# Patient Record
Sex: MALE | Race: WHITE | NOT HISPANIC OR LATINO | Employment: OTHER | URBAN - METROPOLITAN AREA
[De-identification: names, ages, dates, MRNs, and addresses within clinical notes are randomized per-mention and may not be internally consistent; named-entity substitution may affect disease eponyms.]

---

## 2017-08-10 ENCOUNTER — ALLSCRIPTS OFFICE VISIT (OUTPATIENT)
Dept: OTHER | Facility: OTHER | Age: 27
End: 2017-08-10

## 2017-08-10 DIAGNOSIS — M54.2 CERVICALGIA: ICD-10-CM

## 2017-08-10 DIAGNOSIS — E78.5 HYPERLIPIDEMIA: ICD-10-CM

## 2017-08-11 ENCOUNTER — GENERIC CONVERSION - ENCOUNTER (OUTPATIENT)
Dept: OTHER | Facility: OTHER | Age: 27
End: 2017-08-11

## 2017-08-11 ENCOUNTER — APPOINTMENT (OUTPATIENT)
Dept: RADIOLOGY | Facility: MEDICAL CENTER | Age: 27
End: 2017-08-11
Payer: MEDICARE

## 2017-08-11 ENCOUNTER — TRANSCRIBE ORDERS (OUTPATIENT)
Dept: ADMINISTRATIVE | Facility: HOSPITAL | Age: 27
End: 2017-08-11

## 2017-08-11 ENCOUNTER — APPOINTMENT (OUTPATIENT)
Dept: LAB | Facility: MEDICAL CENTER | Age: 27
End: 2017-08-11
Payer: MEDICARE

## 2017-08-11 DIAGNOSIS — M54.2 CERVICALGIA: ICD-10-CM

## 2017-08-11 DIAGNOSIS — E78.5 HYPERLIPIDEMIA: ICD-10-CM

## 2017-08-11 LAB
ALBUMIN SERPL BCP-MCNC: 3.6 G/DL (ref 3.5–5)
ALP SERPL-CCNC: 103 U/L (ref 46–116)
ALT SERPL W P-5'-P-CCNC: 22 U/L (ref 12–78)
ANION GAP SERPL CALCULATED.3IONS-SCNC: 7 MMOL/L (ref 4–13)
AST SERPL W P-5'-P-CCNC: 15 U/L (ref 5–45)
BILIRUB SERPL-MCNC: 0.28 MG/DL (ref 0.2–1)
BUN SERPL-MCNC: 14 MG/DL (ref 5–25)
CALCIUM SERPL-MCNC: 9.3 MG/DL (ref 8.3–10.1)
CHLORIDE SERPL-SCNC: 105 MMOL/L (ref 100–108)
CHOLEST SERPL-MCNC: 216 MG/DL (ref 50–200)
CO2 SERPL-SCNC: 28 MMOL/L (ref 21–32)
CREAT SERPL-MCNC: 1.07 MG/DL (ref 0.6–1.3)
ERYTHROCYTE [DISTWIDTH] IN BLOOD BY AUTOMATED COUNT: 14.5 % (ref 11.6–15.1)
GFR SERPL CREATININE-BSD FRML MDRD: 95 ML/MIN/1.73SQ M
GLUCOSE P FAST SERPL-MCNC: 109 MG/DL (ref 65–99)
HCT VFR BLD AUTO: 41.7 % (ref 36.5–49.3)
HDLC SERPL-MCNC: 36 MG/DL (ref 40–60)
HGB BLD-MCNC: 13.7 G/DL (ref 12–17)
LDLC SERPL CALC-MCNC: 133 MG/DL (ref 0–100)
MCH RBC QN AUTO: 29.2 PG (ref 26.8–34.3)
MCHC RBC AUTO-ENTMCNC: 32.9 G/DL (ref 31.4–37.4)
MCV RBC AUTO: 89 FL (ref 82–98)
PLATELET # BLD AUTO: 209 THOUSANDS/UL (ref 149–390)
PMV BLD AUTO: 11.1 FL (ref 8.9–12.7)
POTASSIUM SERPL-SCNC: 3.8 MMOL/L (ref 3.5–5.3)
PROT SERPL-MCNC: 7.5 G/DL (ref 6.4–8.2)
RBC # BLD AUTO: 4.69 MILLION/UL (ref 3.88–5.62)
SODIUM SERPL-SCNC: 140 MMOL/L (ref 136–145)
TRIGL SERPL-MCNC: 236 MG/DL
TSH SERPL DL<=0.05 MIU/L-ACNC: 1.14 UIU/ML (ref 0.36–3.74)
WBC # BLD AUTO: 7.01 THOUSAND/UL (ref 4.31–10.16)

## 2017-08-11 PROCEDURE — 80053 COMPREHEN METABOLIC PANEL: CPT

## 2017-08-11 PROCEDURE — 80061 LIPID PANEL: CPT

## 2017-08-11 PROCEDURE — 85027 COMPLETE CBC AUTOMATED: CPT

## 2017-08-11 PROCEDURE — 36415 COLL VENOUS BLD VENIPUNCTURE: CPT

## 2017-08-11 PROCEDURE — 84443 ASSAY THYROID STIM HORMONE: CPT

## 2017-08-11 PROCEDURE — 72050 X-RAY EXAM NECK SPINE 4/5VWS: CPT

## 2017-10-30 ENCOUNTER — ALLSCRIPTS OFFICE VISIT (OUTPATIENT)
Dept: OTHER | Facility: OTHER | Age: 27
End: 2017-10-30

## 2017-10-31 NOTE — PROGRESS NOTES
Assessment  1  Encounter for preventive health examination (V70 0) (Z00 00)    Discussion/Summary    Physical forms were filled out  Follow-up as needed  Chief Complaint  patient present's today for pre employment paperwork      History of Present Illness  HPI: He presents today for a work physical papers to be filled out  Review of Systems    Constitutional: no fever or chills, feels well, no tiredness, no recent weight loss or weight gain  ENT: no complaints of earache, no loss of hearing, no nosebleeds or nasal discharge, no sore throat or hoarseness  Cardiovascular: no complaints of slow or fast heart rate, no chest pain, no palpitations, no leg claudication or lower extremity edema  Respiratory: no complaints of shortness of breath, no wheezing or cough, no dyspnea on exertion, no orthopnea or PND  Gastrointestinal: no complaints of abdominal pain, no constipation, no nausea or vomiting, no diarrhea or bloody stools  Genitourinary: no complaints of dysuria or incontinence, no hesitancy, no nocturia, no genital lesion, no inadequacy of penile erection  Musculoskeletal: no complaints of arthralgia, no myalgia, no joint swelling or stiffness, no limb pain or swelling  Integumentary: no complaints of skin rash or lesion, no itching or dry skin, no skin wounds  Neurological: no complaints of headache, no confusion, no numbness or tingling, no dizziness or fainting  Active Problems  1  [place] hump (278 1) (E65)   2  YISEL (generalized anxiety disorder) (300 02) (F41 1)   3  Hyperlipidemia (272 4) (E78 5)   4  Neck pain (723 1) (M54 2)   5  Orthostatic hypotension (458 0) (I95 1)   6  Schizophrenia (295 90) (F20 9)    Past Medical History  Active Problems And Past Medical History Reviewed: The active problems and past medical history were reviewed and updated today  Family History  Father    1  Family history of alcohol abuse (V61 41) (Z81 1)   2   Family history of Fibromyalgia  Maternal Grandmother    3  Family history of paranoid schizophrenia (V17 0) (Z81 8)  Paternal Uncle    4  Family history of paranoid schizophrenia (V17 0) (Z81 8)  Family History Reviewed: The family history was reviewed and updated today  Social History   · Education Level: Some college   · Never A Smoker   · Never Drank Alcohol   · Single  The social history was reviewed and updated today  Surgical History  1  History of Oral Surgery Tooth Extraction  Surgical History Reviewed: The surgical history was reviewed and updated today  Current Meds   1  Citalopram Hydrobromide 40 MG Oral Tablet; TAKE 1 TABLET DAILY; Therapy: [id number] to Recorded   2  ClonazePAM 0 5 MG Oral Tablet; TAKE 1 TABLET Twice daily PRN; Therapy: 75QNW7326 to (Evaluate:02Jun2015) Recorded   3  CloZAPine 100 MG Oral Tablet; TAKE 1 1/2  TABLET PO  Bedtime; Therapy: 41ITA1009 to ([place]) Recorded   4  Vraylar 1 5 MG Oral Capsule; Therapy: 85DZO3053 to Recorded    The medication list was reviewed and updated today  Allergies  1  No Known Drug Allergies    Vitals   Recorded: 59EIM2314 12:36PM   Temperature 98 4 F, Tympanic   Systolic 368, LUE, Sitting   Diastolic 72, LUE, Sitting   Height 5 ft 10 08 in   Weight 228 lb 2 oz   BMI Calculated 32 66   BSA Calculated 2 21     Physical Exam    Constitutional   General appearance: No acute distress, well appearing and well nourished  Eyes   Conjunctiva and lids: No swelling, erythema, or discharge  Pupils and irises: Equal, round and reactive to light  Ears, Nose, Mouth, and Throat   External inspection of ears and nose: Normal     Otoscopic examination: Tympanic membrance translucent with normal light reflex  Canals patent without erythema  Nasal mucosa, septum, and turbinates: Normal without edema or erythema  Oropharynx: Normal with no erythema, edema, exudate or lesions      Pulmonary   Respiratory effort: No increased work of breathing or signs of respiratory distress  Auscultation of lungs: Clear to auscultation, equal breath sounds bilaterally, no wheezes, no rales, no rhonci  Cardiovascular   Palpation of heart: Normal PMI, no thrills  Auscultation of heart: Normal rate and rhythm, normal S1 and S2, without murmurs  Examination of extremities for edema and/or varicosities: Normal     Carotid pulses: Normal     Abdomen   Abdomen: Non-tender, no masses  Liver and spleen: No hepatomegaly or splenomegaly  Lymphatic   Palpation of lymph nodes in neck: No lymphadenopathy  Musculoskeletal   Gait and station: Normal     Digits and nails: Normal without clubbing or cyanosis  Inspection/palpation of joints, bones, and muscles: Normal     Skin   Skin and subcutaneous tissue: Normal without rashes or lesions  Neurologic   Cranial nerves: Cranial nerves 2-12 intact  Reflexes: 2+ and symmetric  Sensation: No sensory loss      Psychiatric   Orientation to person, place and time: Normal     Mood and affect: Normal          Future Appointments    Date/Time Provider Specialty Site   02/12/2018 12:30 PM Joseph Carter DO Family Medicine  [facility]     Signatures   Electronically signed by : Ails Cm DO; Oct 30 2017  1:04PM EST                       (Author)

## 2018-01-11 NOTE — PSYCH
History of Present Illness  [facility] Clinical Progress Note [facility]:   Specialized Services Documentation - Therapist must complete separate progress note for each specific clinical activity in which the client participated during the day  (262) Group Psychotherapy: ([id number]) Client attended group focused on self-sabotage and how it can hinder the group from self-care, and group members discussed their common excuses for avoiding activity, while finally reflecting on some self-care methods that would benefit them  Sima Farias shared that he makes excuses to procrastinate on projects  Minimal progress toward goal  Continue to attend group to offer opportunities to relate to peers appropriately  VINNY Nazario    Treatment Plan Problem(s): 1 0      (761) Group Psychotherapy: [id number] Sima Farias participated in wellness group focused on identifying self-esteem and the relationship of self-esteem to mental illness  Pt shared that he can laugh at himself when he makes mistakes and gave the example of tripping  Sima Farias stated he doesn't get angry with himself he laughs  Pt stated that he "only has a problem feeling comfortable meeting new people when they don't talk to me or they don't talk first " Sima Farias did admit that he does not practice social skills starting conversations and expects that others will do so  Pt made minimal progress toward goal  Continue group to educate patient on the impact of low self-esteem on mental health and methods to increase self-esteem for more positive outcomes in recovery  Treatment Plan Problem(s): 1 0  Cynthia Bella RN       (754) Education Therapy Goals set - to call [employer]    Treatment Plan Problem(s): 1 6  Education Therapy Time - 0900 - 0930 Previous goal was not met  Readiness to Learning:  He is receptive to learning  There are  no barriers to learning  Learning Assessment Time - 1330 - 1400   Education completed on  illness, aftercare and wellness tools     The teaching method was  verbal  Shared area of learning: Yes  DELPHINE SpringBC     (283) Allied Therapy [id number] Wilfredoshahla Trotterbaldo actively shared in [facility] group focused on boundary setting  He engaged in improvisation and was attentive during discussion exploring value of and ways to set healthy limits with self and others  Group discussed reality of feeling guilty at times, yet the anxiety and chaos left if one chooses not to set limits and practiced boundaries with given scenarios  He was quiet but pleasant  Inconsistent work toward goal noted  Continue AT to encourage skill development and use  Treatment Plan Problem(s): 1 5  DELPHINE SpringBC       Case Management Note:   [id number] Wilfredo Harrington met with CM to discuss D/C  He understands that tomorrow is D/C date  Relapse Prevention Plan form given to Wilfredo Len to complete and assistance offered, if he feels it is necessary, to complete  VM left for pt's ICM to notifiy of D/C per patient's, and ICM request  CM also left VM message for patient's parents offering family session tomorrow afternoon  Waiting for return call  TREATMENT SESSION NUMBER: 10   Current suicide risk is low  Medications not changed/added/denied  Brandi Sexton, RN      Active Problems    1  YISEL (generalized anxiety disorder) (300 02) (F41 1)   2  Hyperlipidemia (272 4) (E78 5)   3  Schizophrenia (295 90) (F20 9)   4  Strain of left foot, initial encounter (845 10) (I78 246E)    Allergies    1  No Known Drug Allergies    Current Meds   1  Citalopram Hydrobromide 40 MG Oral Tablet; TAKE 1 TABLET DAILY; Therapy: 70PMQ5791 to Recorded   2  ClonazePAM 0 5 MG Oral Tablet; TAKE 1 TABLET Twice daily PRN; Therapy: 21IUR6855 to (Evaluate:02Jun2015) Recorded   3  CloZAPine 100 MG Oral Tablet; TAKE 1 1/2  TABLET PO  Bedtime; Therapy: 26Apr2016 to (Ирина Tovar) Recorded    Family Psych History  Father    1  Family history of alcohol abuse (V61 41) (Z81 1)   2   Family history of Fibromyalgia  Maternal Grandmother    3  Family history of paranoid schizophrenia (V17 0) (Z81 8)  Paternal Uncle    4  Family history of paranoid schizophrenia (V17 0) (Z81 8)    Social History    · Education Level: Some college   · Never A Smoker   · Never Drank Alcohol   · Single    Future Appointments    Date/Time Provider Specialty Site   05/24/2016 10:00 AM VINNY Morgan   [facility] PARTIAL HOSPITALIZATION     Signatures   Electronically signed by : Grisel Abbasi MEd; May 23 2016 10:41AM EST                       (Author)    Electronically signed by : Sarina Bond RN; May 23 2016  2:16PM EST                       (Author)    Electronically signed by : MAURICE Vo; May 23 2016  2:29PM EST                       (Author)    Electronically signed by : Sarina Bond RN; May 23 2016  3:56PM EST                       (Author)
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
## 2018-01-12 NOTE — PSYCH
History of Present Illness  [facility] Clinical Progress Note [facility]:   Specialized Services Documentation - Therapist must complete separate progress note for each specific clinical activity in which the client participated during the day  (212) Group Psychotherapy: ([id number]) Sima Farias sat quietly throughout psychotherapy group focused on relationships and coping  Although he seemed to be actively listening as peers shared, he did not join in any peer discussions  No progress towards goal  Continue group to offer opportunity to relate to peers around similar issues  Treatment Plan Problem(s): 1 1  Dillon Montoya LCSW     (474) Group Psychotherapy: 0130-0733 Sima Farias participated in wellness group focused on assessment of weekly goal work in each area of functioning; including physical, emotional, cognitive, social and spiritual  Pt shared that he has targeted the area of increasing his physical activity to feel better  He stated that he used to "work out 3-5 times weekly in high school but does not exercise on a regular basis now and has gained weight  Pt states he knows to build up gradually to exercising 3-5 times weekly as he is "not in shape " Pt made good beginning progress toward goal  Continue group to educate and encourage patient to identify areas of functioning requiring ongoing attention for healthier functioning  Treatment Plan Problem(s): 1 1  Cynthia Bella RN       (184) Education Therapy Goals set - job search for 1 hour    Treatment Plan Problem(s): 1 1  Education Therapy Time - 0900 - 0930, Time first day   Readiness to Learning:  He is receptive to learning  There are  no barriers to learning  Learning Assessment Time - 1330 - 1400   Education completed on  illness, medication and wellness tools  The teaching method was  verbal  Shared area of learning: Yes  MAURICE Browning     (020) Allied Therapy 5126-0700 Sima Farias was excused from [facility] group due to case management assessment  Kamryn Austin [facility]       Case Management Note:   9421-1895 Lori Davey met with CM to complete Initial Evaluation and sign ROIs  Pt was given contact information for [facility] program and crisis contacts  Pt denies SI and HI and states he has been to [facility] in the past and is here now voluntarily  Pt has requested [facility] van transportation  This CM/RN has discussed with pt necessity of having labs drawn weekly for Clozaril monitoring and Lori Davey states he understands and is willing to comply  He will go with his mother, Oriana Colon (emergency contact) for lab draws  Pt educated about requirements for attendance and other information regarding Program    TREATMENT SESSION NUMBER: 1   Current suicide risk is low  Medications not changed/added/denied  Nellie Webster RN      Active Problems     1  Hyperlipidemia (272 4) (E78 5)   2  Strain of left foot, initial encounter (845 10) (D71 998C)    Schizophrenia (295 90) (F20 9)          Allergies    1  No Known Drug Allergies    Current Meds   1  Citalopram Hydrobromide 20 MG Oral Tablet; Therapy: 81MSY4134 to Recorded   2  ClonazePAM 0 5 MG Oral Tablet; Therapy: 22VZB9323 to Recorded   3  RisperiDONE 1 MG Oral Tablet; TAKE 2 TABLET Bedtime; Therapy: 34RMH6576 to (Evaluate:30Rrn4706)  Requested for: 42Kci8978; Last   Rx:24Exb5655 Ordered   4  RisperiDONE 4 MG Oral Tablet; Therapy: 97HPR0231 to Recorded   5  Zorvolex 35 MG Oral Capsule; TAKE 1 CAPSULE 3 times daily PRN joint pain; Therapy: 72Xot5475 to (Evaluate:78Rhl8399); Last Rx:76Qit7509 Ordered    Family Psych History  Father    1  Family history of Fibromyalgia    Social History    · Never A Smoker   · Never Drank Alcohol    Future Appointments    Date/Time Provider Specialty Site   05/09/2016 12:00 PM Padmini Go M D  Psychiatry [facility] PARTIAL HOSPITALIZATION   05/10/2016 12:00 PM Padmini Go M D   Psychiatry [facility] PARTIAL HOSPITALIZATION   05/11/2016 11:45 AM Padmini Go [facility]NY Edwards   05/12/2016 11:45 AM VINNY Good  Psychiatry [facility] PARTIAL HOSPITALIZATION   05/13/2016 12:15 PM Jennifer Go M D   Psychiatry [facility] PARTIAL HOSPITALIZATION     Signatures   Electronically signed by : Keenan Gant RN; May  6 2016 12:21PM EST                       (Author)    Electronically signed by : Den Esteves LCSW; May  6 2016  2:18PM EST                       (Author)    Electronically signed by : MAURICE Friedman; May  6 2016  2:23PM EST                       (Author)    Electronically signed by : Keenan Gant RN; May  6 2016  4:04PM EST                       (Author)

## 2018-01-13 VITALS
WEIGHT: 228.13 LBS | TEMPERATURE: 98.4 F | SYSTOLIC BLOOD PRESSURE: 120 MMHG | HEIGHT: 70 IN | BODY MASS INDEX: 32.66 KG/M2 | DIASTOLIC BLOOD PRESSURE: 72 MMHG

## 2018-01-13 NOTE — RESULT NOTES
Verified Results  * XR SPINE CERVICAL COMPLETE 4 OR 5 VW NON INJURY 11Aug2017 02:52PM Yumiko Tavera Order Number: [id number]     Test Name Result Flag Reference   XR SPINE CERVICAL COMPLETE 4 OR 5 VW (Report)     CERVICAL SPINE     INDICATION: Neck pain  COMPARISON: None     VIEWS: 5     IMAGES: 5     FINDINGS:     No evidence of fracture or subluxation  The intervertebral disc spaces are preserved  The neural foramina are patent  The prevertebral soft tissues are within normal limits  The lung apices are intact  IMPRESSION:     Unremarkable cervical spine         Workstation performed: [id number]     Signed by:   Elise Mojica MD   8/16/17
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
## 2018-01-13 NOTE — PROGRESS NOTES
Assessment    1  Encounter for preventive health examination (V70 0) (Z00 00)    Plan  Health Maintenance    · (1) COMPREHENSIVE METABOLIC PANEL; Status:Active; Requested for:[id number]; Health Maintenance, YISEL (generalized anxiety disorder), Hyperlipidemia    · (1) TSH; Status:Active; Requested for:[id number]; Health Maintenance, Hyperlipidemia    · (1) LIPID PANEL, FASTING; Status:Active; Requested for:[id number];     I will call with the lab test results  Followup here as needed  He'll continue to see Dr Bindu Burt for his other medication and lab work  Discussion/Summary  Impression: health maintenance visit  Chief Complaint  PT HERE FOR WELLNESS EXAM BEFORE ENTERING ASSISTED LIVING  HE STATES HE DOES NOT HAVE ANY FORMS TO FILL OUT  History of Present Illness  HM, Adult Male: The patient is being seen for a health maintenance evaluation  General Health: The patient's health since the last visit is described as good  Lifestyle:  He consumes a diverse and healthy diet  He does not have any weight concerns  He does not exercise regularly  He does not use tobacco  He denies alcohol use  He denies drug use  Screening:      Review of Systems    Constitutional: No fever or chills, feels well, no tiredness, no recent weight gain or weight loss  Eyes: No complaints of eye pain, no red eyes, no discharge from eyes, no itchy eyes  ENT: no complaints of earache, no hearing loss, no nosebleeds, no nasal discharge, no sore throat, no hoarseness  Cardiovascular: No complaints of slow heart rate, no fast heart rate, no chest pain, no palpitations, no leg claudication, no lower extremity  Respiratory: No complaints of shortness of breath, no wheezing, no cough, no SOB on exertion, no orthopnea or PND  Gastrointestinal: No complaints of abdominal pain, no constipation, no nausea or vomiting, no diarrhea or bloody stools     Genitourinary: No complaints of dysuria, no incontinence, no hesitancy, no nocturia, no genital lesion, no testicular pain  Musculoskeletal: No complaints of arthralgia, no myalgias, no joint swelling or stiffness, no limb pain or swelling  Integumentary: No complaints of skin rash or skin lesions, no itching, no skin wound, no dry skin  Neurological: No compliants of headache, no confusion, no convulsions, no numbness or tingling, no dizziness or fainting, no limb weakness, no difficulty walking  Endocrine: No complaints of proptosis, no hot flashes, no muscle weakness, no erectile dysfunction, no deepening of the voice, no feelings of weakness  Hematologic/Lymphatic: No complaints of swollen glands, no swollen glands in the neck, does not bleed easily, no easy bruising  Active Problems    1  YISEL (generalized anxiety disorder) (300 02) (F41 1)   2  Hyperlipidemia (272 4) (E78 5)   3  Schizophrenia (295 90) (F20 9)   4  Strain of left foot, initial encounter (845 10) (E80 850V)    Surgical History    · History of Oral Surgery Tooth Extraction    Family History  Father    · Family history of alcohol abuse (V61 41) (Z81 1)   · Family history of Fibromyalgia  Maternal Grandmother    · Family history of paranoid schizophrenia (V17 0) (Z81 8)  Paternal Uncle    · Family history of paranoid schizophrenia (V17 0) (Z81 8)    Social History    · Education Level: Some college   · Never A Smoker   · Never Drank Alcohol   · Single    Current Meds   1  Citalopram Hydrobromide 40 MG Oral Tablet; TAKE 1 TABLET DAILY; Therapy: 46PCK5875 to Recorded   2  ClonazePAM 0 5 MG Oral Tablet; TAKE 1 TABLET Twice daily PRN; Therapy: 77HEX4111 to (Evaluate:02Jun2015) Recorded   3  CloZAPine 100 MG Oral Tablet; TAKE 1 1/2  TABLET PO  Bedtime; Therapy: 11KHA6771 to ([facility]) Recorded   4  Loxapine Succinate 10 MG Oral Capsule; Therapy: 56RAT3360 to Recorded    Allergies    1   No Known Drug Allergies    Vitals   Recorded: 20ARZ1021 18:31NG   Systolic 949, LUE, Sitting   Diastolic 80, MINNAE, Sitting   Height 5 ft 11 in   Weight 226 lb    BMI Calculated 31 52   BSA Calculated 2 22     Physical Exam    Constitutional   General appearance: No acute distress, well appearing and well nourished  Eyes   Conjunctiva and lids: No swelling, erythema, or discharge  Pupils and irises: Equal, round and reactive to light  Ears, Nose, Mouth, and Throat   External inspection of ears and nose: Normal     Otoscopic examination: Tympanic membrance translucent with normal light reflex  Canals patent without erythema  Oropharynx: Normal with no erythema, edema, exudate or lesions  Pulmonary   Respiratory effort: No increased work of breathing or signs of respiratory distress  Auscultation of lungs: Clear to auscultation  Cardiovascular   Palpation of heart: Normal PMI, no thrills  Auscultation of heart: Normal rate and rhythm, normal S1 and S2, without murmurs  Examination of extremities for edema and/or varicosities: Normal     Abdomen   Abdomen: Non-tender, no masses  Liver and spleen: No hepatomegaly or splenomegaly  Lymphatic   Palpation of lymph nodes in neck: No lymphadenopathy  Musculoskeletal   Gait and station: Normal     Digits and nails: Normal without clubbing or cyanosis  Inspection/palpation of joints, bones, and muscles: Normal     Skin   Skin and subcutaneous tissue: Normal without rashes or lesions  Neurologic   Cranial nerves: Cranial nerves 2-12 intact  Reflexes: 2+ and symmetric  Sensation: No sensory loss      Psychiatric   Orientation to person, place and time: Normal     Mood and affect: Normal        Signatures   Electronically signed by : Cale Garcia DO; Aug  8 2016 10:45AM EST                       (Author)

## 2018-01-15 NOTE — PSYCH
History of Present Illness  [facility] Clinical Progress Note [facility]:   Specialized Services Documentation - Therapist must complete separate progress note for each specific clinical activity in which the client participated during the day  Case Management Note:   0800 Solo informed the [facility] , when he arrived at pt's home, that he was not going to attend [facility] today  This CM contacted pt's father, Chito Cunningham who stated that Artem Edwards reported "not feeling well today " He stated that Artem Edwards may also be staying home as some car parts he had ordered were being delivered and he stated that he discussed with pt that if he was not feeling well he should not be working on his car  Pt was sleeping father stated but will be in Program tomorrow  Pt's labs were received and reviewed by Dr Eri Trejo  Current suicide risk is low  Medications not changed/added/denied  Draed Brooks RN      Active Problems    1  YISEL (generalized anxiety disorder) (300 02) (F41 1)   2  Hyperlipidemia (272 4) (E78 5)   3  Schizophrenia (295 90) (F20 9)   4  Strain of left foot, initial encounter (845 10) (E91 397L)    Allergies    1  No Known Drug Allergies    Current Meds   1  Citalopram Hydrobromide 40 MG Oral Tablet; TAKE 1 TABLET DAILY; Therapy: 93ZXB4822 to Recorded   2  ClonazePAM 0 5 MG Oral Tablet; TAKE 1 TABLET Twice daily PRN; Therapy: 73QRJ8051 to (Evaluate:02Jun2015) Recorded   3  CloZAPine 100 MG Oral Tablet; TAKE 1 1/2  TABLET PO  Bedtime; Therapy: 26Apr2016 to ([facility]) Recorded    Family Psych History  Father    1  Family history of alcohol abuse (V61 41) (Z81 1)   2  Family history of Fibromyalgia  Maternal Grandmother    3  Family history of paranoid schizophrenia (V17 0) (Z81 8)  Paternal Uncle    4   Family history of paranoid schizophrenia (V17 0) (Z81 8)    Social History    · Education Level: Some college   · Never A Smoker   · Never Drank Alcohol   · Single    Future Appointments    Date/Time Provider Specialty Site   05/19/2016 10:30 AM VINNY Angulo  Psychiatry [facility]   05/20/2016 10:30 AM VINNY Angulo   Psychiatry [facility]     Signatures   Electronically signed by : Abigail Machado RN; May 18 2016  9:24AM EST                       (Author)    Electronically signed by : Abigail Machado RN; May 18 2016  9:26AM EST                       (Author)

## 2018-01-15 NOTE — PROGRESS NOTES
Assessment    1  Encounter for preventive health examination (V70 0) (Z00 00)   2  Hyperlipidemia (272 4) (E78 5)   3  Neck pain (723 1) (M54 2)   4  [place] hump (278 1) (E65)    Plan  Hyperlipidemia    · (1) CBC/ PLT (NO DIFF); Status:Active; Requested for:10Aug2017;    · (1) COMPREHENSIVE METABOLIC PANEL; Status:Active; Requested for:10Aug2017;    · (1) LIPID PANEL, FASTING; Status:Active; Requested for:10Aug2017;    · (1) TSH; Status:Active; Requested for:10Aug2017;    · Begin or continue regular aerobic exercise  Gradually work up to at least 3 sessions of 30  minutes of exercise a week ; Status:Complete;   Done: 15QLQ9634 01:10PM   · Follow-up visit in 4 Months Evaluation and Treatment  Follow-up  Status: Hold For -  Scheduling  Requested for: 10Aug2017  Neck pain    · * XR SPINE CERVICAL COMPLETE 4 OR 5 VW NON INJURY; Status:Active; Requested  for:10Aug2017;     Discussion/Summary  Impression: health maintenance visit  We'll check some lab work and he will call with results  Also check an x-ray of his neck  He does follow-up with his psychiatrist to see if any of this could be side effects from his medication  Follow-up here as needed  Chief Complaint  annual physical   complains of stiffness in neck/back      History of Present Illness  , Adult Male: The patient is being seen for a health maintenance evaluation  General Health: The patient's health since the last visit is described as good  Screening:   HPI: He has been feeling well however he does complain of neck pain and crunching when he moves his head  His has been going on for several years  Review of Systems    Constitutional: No fever or chills, feels well, no tiredness, no recent weight gain or weight loss  Eyes: No complaints of eye pain, no red eyes, no discharge from eyes, no itchy eyes  ENT: no complaints of earache, no hearing loss, no nosebleeds, no nasal discharge, no sore throat, no hoarseness     Cardiovascular: No complaints of slow heart rate, no fast heart rate, no chest pain, no palpitations, no leg claudication, no lower extremity  Respiratory: No complaints of shortness of breath, no wheezing, no cough, no SOB on exertion, no orthopnea or PND  Gastrointestinal: No complaints of abdominal pain, no constipation, no nausea or vomiting, no diarrhea or bloody stools  Musculoskeletal: as noted in HPI  Integumentary: No complaints of skin rash or skin lesions, no itching, no skin wound, no dry skin  Neurological: No compliants of headache, no confusion, no convulsions, no numbness or tingling, no dizziness or fainting, no limb weakness, no difficulty walking  Active Problems    1  YISEL (generalized anxiety disorder) (300 02) (F41 1)   2  Hyperlipidemia (272 4) (E78 5)   3  Orthostatic hypotension (458 0) (I95 1)   4  Schizophrenia (295 90) (F20 9)    Surgical History    · History of Oral Surgery Tooth Extraction    Family History  Father    · Family history of alcohol abuse (V61 41) (Z81 1)   · Family history of Fibromyalgia  Maternal Grandmother    · Family history of paranoid schizophrenia (V17 0) (Z81 8)  Paternal Uncle    · Family history of paranoid schizophrenia (V17 0) (Z81 8)    Social History    · Education Level: Some college   · Never A Smoker   · Never Drank Alcohol   · Single    Current Meds   1  Citalopram Hydrobromide 40 MG Oral Tablet; TAKE 1 TABLET DAILY; Therapy: 96MZR3738 to Recorded   2  ClonazePAM 0 5 MG Oral Tablet; TAKE 1 TABLET Twice daily PRN; Therapy: 93CYW9760 to (Evaluate:02Jun2015) Recorded   3  CloZAPine 100 MG Oral Tablet; TAKE 1 1/2  TABLET PO  Bedtime; Therapy: 46SHP4115 to ([facility]) Recorded   4  Vraylar 1 5 MG Oral Capsule; Therapy: 76KCX9430 to Recorded    Allergies    1   No Known Drug Allergies    Vitals   Recorded: 10Aug2017 12:42PM   Temperature 98 2 F, Tympanic   Heart Rate 124, L Radial   Pulse Quality Normal, L Radial   Systolic 425, LUE, Sitting Diastolic 80, LUE, Sitting   Height 5 ft 10 in   Weight 228 lb 8 oz   BMI Calculated 32 79   BSA Calculated 2 21   O2 Saturation 98     Physical Exam    Constitutional   General appearance: Abnormal   comfortable and obese  Eyes   Conjunctiva and lids: No swelling, erythema, or discharge  Pupils and irises: Equal, round and reactive to light  Ears, Nose, Mouth, and Throat   External inspection of ears and nose: Normal     Otoscopic examination: Tympanic membrance translucent with normal light reflex  Canals patent without erythema  Oropharynx: Normal with no erythema, edema, exudate or lesions  Pulmonary   Respiratory effort: No increased work of breathing or signs of respiratory distress  Auscultation of lungs: Clear to auscultation  Cardiovascular   Palpation of heart: Normal PMI, no thrills  Auscultation of heart: Normal rate and rhythm, normal S1 and S2, without murmurs  Examination of extremities for edema and/or varicosities: Normal     Abdomen   Abdomen: Non-tender, no masses  Liver and spleen: No hepatomegaly or splenomegaly  Lymphatic   Palpation of lymph nodes in neck: No lymphadenopathy  Musculoskeletal   Gait and station: Normal     Digits and nails: Abnormal   neck pain, decrerased rom, [place] hump  Inspection/palpation of joints, bones, and muscles: Normal     Skin   Skin and subcutaneous tissue: Normal without rashes or lesions  Neurologic   Cranial nerves: Cranial nerves 2-12 intact  Reflexes: 2+ and symmetric  Sensation: No sensory loss  Psychiatric   Orientation to person, place and time: Normal     Mood and affect: Normal        Results/Data  PHQ-9 Adult Depression Screening 10Aug2017 12:46PM User Ketsus     Test Name Result Flag Reference   PHQ-9 Adult Depression Score 4     Over the last two weeks, how often have you been bothered by any of the following problems?   Little interest or pleasure in doing things: Nearly every day - 3  Feeling down, depressed, or hopeless: Not at all - 0  Trouble falling or staying asleep, or sleeping too much: Not at all - 0  Feeling tired or having little energy: Several days - 1  Poor appetite or over eating: Not at all - 0  Feeling bad about yourself - or that you are a failure or have let yourself or your family down: Not at all - 0  Trouble concentrating on things, such as reading the newspaper or watching television: Not at all - 0  Moving or speaking so slowly that other people could have noticed   Or the opposite -  being so fidgety or restless that you have been moving around a lot more than usual: Not at all - 0  Thoughts that you would be better off dead, or of hurting yourself in some way: Not at all - 0   PHQ-9 Adult Depression Screening Negative     PHQ-9 Difficulty Level Somewhat difficult     PHQ-9 Severity Minimal Depression         Signatures   Electronically signed by : Ade Joseph DO; Aug 10 2017  1:12PM EST                       (Author)

## 2018-01-15 NOTE — PSYCH
History of Present Illness  [facility] Clinical Progress Note [facility]:   Specialized Services Documentation - Therapist must complete separate progress note for each specific clinical activity in which the client participated during the day  (034) Group Psychotherapy: 12:30-1:30pm   Ct participated in group  The focus of the group was anger management  Reviewed handouts, Anger and the 3 R's, anger triggers and Comparison between healthy and unhealthy anger traits  Each ct also listed two of their anger triggers  Identified his father as a trigger  Spontaneously participated not only when called on to do so  Mild progress on goal  Continue with group  Treatment Plan Problem(s): 1 1  Maddy Arcos, EUGENE       (423) Group Psychotherapy: [id number] Reina Costello participated in wellness group focused on unmet expectations role in anger toward self and others  Pt listened attentively and followed along in handout as discussion proceeded with peers but Reina Costello did not contribute to discussion today except to acknowledge that he does not meet his parents expectations at times and they do not meet his expectations at times  Pt made minimal progress toward goals  Continue goal to educate pt regarding the powerful force expectations have in determining our behaviors and responses including practicing more healthy expectations that will decrease rather than increase anger  Treatment Plan Problem(s): 1 1  Alycia Borrego RN     (169) Education Therapy Goals set - work on car    Treatment Plan Problem(s): 1 1  Education Therapy Time - 0900 - 0930 Previous goal was met  Readiness to Learning:  He is receptive to learning  There are  no barriers to learning  Learning Assessment Time - 1330 - 1400   Education completed on  illness and wellness tools  The teaching method was  verbal  Shared area of learning: Yes   Joseline Carter MT-BC     (508) Allied Therapy [id number] Reina Costello actively shared in relaxation group focused on skill development and the concept of giovanna santos  Engaged in therapist led exercises and reported benefit  During group discussion on giovanna santos, he participated in group reading yet did not add any personal disclosure  Group reinforced importance of consistently caring for one's self and use of strategies explored to refocus to the present  Some effort toward goal noted  Continue AT to increase skill awareness and use of skills consistently  Treatment Plan Problem(s): 1 1, 1 2  Kamryn Austin [place]       Case Management Note:   [id number] Lori Davey met with CM, and also spoke briefly with Dr Tarik Washburn, regarding his Clozaril medication  Pt was advised that Dr Tarik Washburn has sent an order to his [facility] for 150 mg Cozaril and this is the dose that MD wants him to take  Pt stated he understood these directions  This CM contacted pt's mother, Oriana Colon to advise her of same  TREATMENT SESSION NUMBER: 5   Current suicide risk is low  Medications not changed/added/denied  Nellie Later, RN      Active Problems    1  YISEL (generalized anxiety disorder) (300 02) (F41 1)   2  Hyperlipidemia (272 4) (E78 5)   3  Schizophrenia (295 90) (F20 9)   4  Strain of left foot, initial encounter (845 10) (J26 456D)    Allergies    1  No Known Drug Allergies    Current Meds   1  Citalopram Hydrobromide 40 MG Oral Tablet; TAKE 1 TABLET DAILY; Therapy: 63HUE1915 to Recorded   2  ClonazePAM 0 5 MG Oral Tablet; TAKE 1 TABLET Twice daily PRN; Therapy: 67OZH7932 to (Evaluate:02Jun2015) Recorded   3  CloZAPine 100 MG Oral Tablet; TAKE 1 1/2  TABLET PO  Bedtime; Therapy: 97Vpr9181 to ([facility]) Recorded    Family Psych History  Father    1  Family history of alcohol abuse (V61 41) (Z81 1)   2  Family history of Fibromyalgia  Maternal Grandmother    3  Family history of paranoid schizophrenia (V17 0) (Z81 8)  Paternal Uncle    4   Family history of paranoid schizophrenia (V17 0) (Z81 8)    Social History    · Education Level: Some college   · Never A Smoker   · Never Drank Alcohol   · Single    Future Appointments    Date/Time Provider Specialty Site   05/13/2016 12:15 PM Ryan Go M D  Psychiatry [facility]   05/16/2016 10:45 AM VINNY Silver   Psychiatry [facility]     Signatures   Electronically signed by : Katiuska Skaggs LCSW; May 12 2016  2:15PM EST                       (Author)    Electronically signed by : MARUICE Bishop; May 12 2016  2:25PM EST                       (Author)    Electronically signed by : Barney Corley RN; May 12 2016  2:32PM EST                       (Author)    Electronically signed by : Barney Corley RN; May 12 2016  2:39PM EST                       (Author)
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
## 2018-01-16 NOTE — PSYCH
History of Present Illness  [facility] Clinical Progress Note [facility]:   Specialized Services Documentation - Therapist must complete separate progress note for each specific clinical activity in which the client participated during the day  (274) Group Psychotherapy: 6357-1369 Gabino Has participated in wellness group focusing on completing a [address] to record relevant information such as medications, allergies, three activities to divert attention when having negative thoughts, three supportive individuals to contact in a crisis and phone numbers for [facility], MD after hours for [facility], county crisis and/or to present to emergency room  Pt shared that he has several supports in crisis but that they are all family members; his mother, his father and his grandmother  Gabino Has was able to identify, with encouragement, that he also had an ICM who can be a support as well as his OP psychiatrist, Dr Adrian Sutherland  Pt made slow progress toward goal  Continue group to educate patient on functional coping strategies to utilize in a mental health crisis  Treatment Plan Problem(s): 1 4  Nikita NIDHI Brower     (249) Group Psychotherapy: 12:30-1:30pm  Ct participated in group  The focus of the group was on distorted thinking  We reviewed handouts on 13 Styles of Distorted Thinking and 12 Rules to Zo[place] Day  Each group member discussed which of the distortions and rules that they need to work on  One of the distorted thoughts ct needs to work on is "polarized thinking " Slow progress towards goal  Continue with group  Treatment Plan Problem(s): 1 1  Patsy Soler LCSW     (127) Education Therapy Goals set - work on job search    Treatment Plan Problem(s): 1 2  Education Therapy Time - 0900 - 0930 Previous goal was met  Readiness to Learning:  He is receptive to learning  There are  no barriers to learning    Learning Assessment Time - 1330 - 1400   Education completed on  illness and wellness tools    The teaching method was  verbal  Shared area of learning: Yes  MAURICE Lucero     (198) Allied Therapy 8103-7651 [facility] focused on barriers to communication  Engaged in tasks exploring what makes it difficult to share and strategies to improve with supports  He was active in the tasks and appeared attentive in discussion yet limited personal feedback  He did attempt to participate in the purnima discussion however his associations were loose  He identified that he could practice avoiding âbelow the belt tacticsâ when he communicates and work on staying on topic  Group reinforced personal role in sharing needs and he participated in âIâ statements practice  Slow exploration of goal noted  Continue AT to encourage development and use of healthy wellness tools  Treatment Plan Problem(s): 1 1, 1 2, 1 4  MAURICE Lucero       Case Management Note:   12: Reina Costello met with CM to discuss weekend and he stated that things were calm at home and that he had worked on his car with his father  Pt stated that he normally does not socialize with his brother, who is 25years old and prefers to "hang out with his friends" but that they did go to [employer] together this weekend  CM introduced the topic of [address] but pt states he has been there but that "Its not for me" but did not explain why despite encouragement  Pt's goal is to socialize more and make friends outside the home  Pt was reminded of resource Myrtis Shone, who pt's [facility] had communicated to this CM, has helped pt find a job in the past  Information was written down and given to Reina Trang so he can contact Myrtis Shone at [employer] [phone]  Pt was reminded of labs due today for Clozaril and pt stated he had not remembered but his mother may remember  TREATMENT SESSION NUMBER: 7   Current suicide risk is low  Medications not changed/added/denied  Alycia Borrego, RN      Active Problems    1   YISEL (generalized anxiety disorder) (300 02) (F41 1)   2  Hyperlipidemia (272 4) (E78 5)   3  Schizophrenia (295 90) (F20 9)   4  Strain of left foot, initial encounter (845 10) (O83 994A)    Allergies    1  No Known Drug Allergies    Current Meds   1  Citalopram Hydrobromide 40 MG Oral Tablet; TAKE 1 TABLET DAILY; Therapy: [id number] to Recorded   2  ClonazePAM 0 5 MG Oral Tablet; TAKE 1 TABLET Twice daily PRN; Therapy: 31ZBY1953 to (Evaluate:02Jun2015) Recorded   3  CloZAPine 100 MG Oral Tablet; TAKE 1 1/2  TABLET PO  Bedtime; Therapy: 27Tgx0448 to ([facility] Recorded    Family Psych History  Father    1  Family history of alcohol abuse (V61 41) (Z81 1)   2  Family history of Fibromyalgia  Maternal Grandmother    3  Family history of paranoid schizophrenia (V17 0) (Z81 8)  Paternal Uncle    4  Family history of paranoid schizophrenia (V17 0) (Z81 8)    Social History    · Education Level: Some college   · Never A Smoker   · Never Drank Alcohol   · Single    Future Appointments    Date/Time Provider Specialty Site   05/17/2016 10:30 AM VINNY Alejandro  Psychiatry [facility]   05/18/2016 10:30 AM VINNY Alejandro  Psychiatry [facility]   05/19/2016 10:30 AM VINNY Alejandro  Psychiatry [facility]   05/20/2016 10:30 AM VINNY Alejandro   Psychiatry [facility]     Signatures   Electronically signed by : Delicia Downing RN; May 16 2016 12:20PM EST                       (Author)    Electronically signed by : Delicia Downing RN; May 16 2016 12:59PM EST                       (Author)    Electronically signed by : Delicia Downing RN; May 16 2016  1:15PM EST                       (Author)    Electronically signed by : Duarte Cohen LCSW; May 16 2016  1:52PM EST                       (Author)    Electronically signed by : MAURICE Patricia; May 16 2016  2:06PM EST                       (Author) Electronically signed by : Alycia Borrego RN; May 16 2016  2:07PM EST                       (Author)

## 2018-01-16 NOTE — PSYCH
History of Present Illness  [facility] Clinical Progress Note [facility]:   Specialized Services Documentation - Therapist must complete separate progress note for each specific clinical activity in which the client participated during the day  ([phone]) Group Psychotherapy: (9:30-10:30) Client attended group in which discussion revolved around life stressors and self-care  Heather Sexton participated in the introductions, and appeared attentive throughout the remainder of group  Minimal progress toward goal  Discharge from program at the end of treatment day today  SULEIMAN Mejia  Treatment Plan Problem(s): 1 0, 1 2      (294) Group Psychotherapy: 8443-2861 Heather Sexton participated in wellness group focusing on improving self-esteem and changing âHow You Feel About Yourself  â Pt shared that he has a negative self-view about socializing with others because he lacks social skills with new people he meets  Peers offered support for practicing social skills as a way to increase self-esteem and enlarge group of friends  Pt stated that there are things he does enjoy and that he feels good about such as discussing cars and he feels very confident when he has discussions with others on this topic  Pt made moderate progress toward goal  D/C today  Treatment Plan Problem(s): 1 0, 1 2  Palak Laguna RN       (050) Education Therapy Goals set - Patient shared encouragement with peers to "be open"    Treatment Plan Problem(s): 1 0    Education Therapy Time - 0900 - 0930 Previous goal was not met  Readiness to Learning:  He is receptive to learning  There are  no barriers to learning  Learning Assessment Time - 1330 - 1400   Education completed on  illness, medication, aftercare and wellness tools  The teaching method was  verbal and written  Shared area of learning: Yes  MAURICE Quiroz     (426) Allied Therapy 8267-2979 Heather Sexton actively shared in [facility] group focused on perception   Group explored development of thoughts based on perception and ways to make choices related to view we take of situations and self  Patient identified influences to his perception and was able to summarize learning from group ânew ways to look at [address] [place] [address]  He was open and appeared able to apply concepts to himself  Group discussed strategies to explore ways to reframe perception to helpful versus hurtful  Some work toward goal noted  Discharge at the end of treatment day  Treatment Plan Problem(s): 1 6, 1 5  Tonya Coreas, [facility]       Case Management Note:   [id number] Ashely Guillen met with this CM to complete Discharge Information, Medication List and Relapse Prevention Plan and received copies of same  Treatment Plan and WRAP were reviewed  Ashely Guillen will meet with this CM and his parents for Family Meeting at [facility]    [id number] CM met with patient, and his family, to review his progress in [address] and his parents agreed that he did gain insights and profited from [employer]  Pt stated that his goal going forward is to continue to "rehearse" being more independent and completing ADLs on a set routine so that he can function, at [facility], needing less supervision and more independently  Pt also discussed healthy strategies for handling anger that do not lead to aggression with his parents  Both of Solo's parents agreed that the best strategy for anger management is for Ashely Guillen to "walk away and walk it off" which is what patient feels most safe and comfortable with  Patient is D/C'd today and [facility] has been notified  TREATMENT SESSION NUMBER: 11   Current suicide risk is low  Medications not changed/added/denied  Domenic Dickinson RN       [facility] Follow-up Physician's Orders:   5/24/2015  8:39 AM Discharge Today   Dr Cale Henry- Michael Grubbs, NIDHI      Active Problems    1  YISEL (generalized anxiety disorder) (300 02) (F41 1)   2  Hyperlipidemia (272 4) (E78 5)   3  Schizophrenia (295 90) (F20 9)   4   Strain of left foot, initial encounter (793 10) (N81 972H)    Allergies    1  No Known Drug Allergies    Current Meds   1  Citalopram Hydrobromide 40 MG Oral Tablet; TAKE 1 TABLET DAILY; Therapy: 43EVQ4883 to Recorded   2  ClonazePAM 0 5 MG Oral Tablet; TAKE 1 TABLET Twice daily PRN; Therapy: 91LSK5962 to (Evaluate:02Jun2015) Recorded   3  CloZAPine 100 MG Oral Tablet; TAKE 1 1/2  TABLET PO  Bedtime; Therapy: 17Rqe4732 to (Seamus Keep) Recorded    Family Psych History  Father    1  Family history of alcohol abuse (V61 41) (Z81 1)   2  Family history of Fibromyalgia  Maternal Grandmother    3  Family history of paranoid schizophrenia (V17 0) (Z81 8)  Paternal Uncle    4   Family history of paranoid schizophrenia (V17 0) (Z81 8)    Social History    · Education Level: Some college   · Never A Smoker   · Never Drank Alcohol   · Single    Signatures   Electronically signed by : VINNY Hendricks ; May 24 2016  8:40AM EST                       (Author)    Electronically signed by : GERMÁN Izaguirre; May 24 2016 10:59AM EST                       (Author)    Electronically signed by : MAURICE Carter; May 24 2016  2:56PM EST                       (Author)    Electronically signed by : Lorraine Boateng RN; May 24 2016  4:26PM EST                       (Author)    Electronically signed by : Lorraine Boateng RN; Jun 21 2016  5:08PM EST                       (Author)
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
## 2018-01-16 NOTE — PSYCH
History of Present Illness  [facility] Clinical Progress Note [facility]:   Specialized Services Documentation - Therapist must complete separate progress note for each specific clinical activity in which the client participated during the day  ([phone]) Group Psychotherapy: [phone] Fermin Murphy participated only during the introductory phase of psychotherapy group focused on change  Although he seemed to be listening he did not join in peer discussions  When prompted, he shared hearing the need to have patience  Minimal progress towards goal noted  Continue group to offer opportunity to relate to peers around similar issues  Treatment Plan Problem(s): 1 1  MAURICE Bishop     (403) Group Psychotherapy: 2198-7736 Fermin Murphy participated in wellness group focused on âRecovery from [facility] and included a handout and review of key concepts in recovery  Fermin Murphy shared that he is here at [facility] to work on his problem with "anger" and to learn better coping strategies when he is angry (than being aggressive physically ) Pt was attentive but shared very limited of his personal experience in the Recovery Process  Fermin Murphy made minimal progress  Continue group to educate patient on what key concepts are in recovery from mental illness and choosing individual recovery goals with the aid of these reilly concepts  Treatment Plan Problem(s): 1 1  Unknown NIDHI Corley       (821) Education Therapy Goals set - continue to research job options    Treatment Plan Problem(s): 1 2  Education Therapy Time - 0900 - 0930 Previous goal was met  Readiness to Learning:  He is receptive to learning  There are  no barriers to learning  Learning Assessment Time - 1330 - 1400   Education completed on  illness and wellness tools  The teaching method was  verbal  Shared area of learning: Yes  MAURICE Bishop     (264) Allied Therapy 2928-4472 Fermin Murphy actively shared in [facility] group exploring emotions   Engaged in task sharing triggers and responses to given emotions  His responses were focused on working on his car  Group reinforced the value in recognizing that AAIFLXQ emotionsâ is not controllable, but learning productive responses is achievable  Some progress noted toward goal  Continue AT to explore healthy emotional regulation and role in practicing wellness tools  Treatment Plan Problem(s): 1 1  Author DELPHINE CavanaughBC       Case Management Note:   [address] met with CM to discuss progress in [facility] program  Pt states that he is learning to handle his anger "better" by walking away rather than remaining in a situation where his anger is escalating  Discussed identifying when he is becoming angry and using coping skills before his anger escalates  Torres Childs states that he is taking his medication as ordered and denies any SE  He stated that he did have labs drawn, after Program yesterday for Clozaril  This CM/RN will contact lab for results as they are not automatically forwarded here  TREATMENT SESSION NUMBER: 7   Current suicide risk is low  Medications not changed/added/denied  Tricia Larios RN      Active Problems    1  YISEL (generalized anxiety disorder) (300 02) (F41 1)   2  Hyperlipidemia (272 4) (E78 5)   3  Schizophrenia (295 90) (F20 9)   4  Strain of left foot, initial encounter (845 10) (Q97 661A)    Allergies    1  No Known Drug Allergies    Current Meds   1  Citalopram Hydrobromide 40 MG Oral Tablet; TAKE 1 TABLET DAILY; Therapy: [id number] to Recorded   2  ClonazePAM 0 5 MG Oral Tablet; TAKE 1 TABLET Twice daily PRN; Therapy: 21ZQY4001 to (Evaluate:02Jun2015) Recorded   3  CloZAPine 100 MG Oral Tablet; TAKE 1 1/2  TABLET PO  Bedtime; Therapy: 91Nxt3076 to (Angi Choudhary) Recorded    Family Psych History  Father    1  Family history of alcohol abuse (V61 41) (Z81 1)   2  Family history of Fibromyalgia  Maternal Grandmother    3   Family history of paranoid schizophrenia (V17 0) (Z81 8)  Paternal

## 2018-01-16 NOTE — PSYCH
History of Present Illness  [facility] Clinical Progress Note [facility]:   Specialized Services Documentation - Therapist must complete separate progress note for each specific clinical activity in which the client participated during the day  ([phone]) Group Psychotherapy: ([phone]) Client attended group focusing on health relationships and communication  Juventino Eddy stated that he has a close, personal relationship with his father who is his main support  Juventino Eddy spoke during group but at times his responses were disorganized or did not make sense  Minimal progress toward goal  Continue to attend group to offer opportunities to relate to peers appropriately  VINNY Moya Ed  Treatment Plan Problem(s): 1 4      (127) Group Psychotherapy: 7291-7180 Juventino Eddy participated in wellness group focused on assessment of weekly goal work in each area of functioning; including physical, emotional, cognitive, social and spiritual  Pt shared that he was going to discuss joining a gym with his parents this weekend saying that he felt he should be more physically active as he was in the past  Peers offered encouragement for pt's goal and he additionally shared that he used to do [employer] and "[employer]," with his father but that they no longer are involved in any physical activity and he misses this  Peers offered other suggestions, aside from gym membership for physical workouts and activity and pt accepted this information well  Juventino Eddy made good progress toward goal  Continue group to educate and encourage patient to identify areas of functioning requiring ongoing attention for healthier functioning  Treatment Plan Problem(s): 1 1,1 2  Shana Turcios RN       (969) Education Therapy Goals set - to call "[employer]"    Treatment Plan Problem(s): 1 6  Education Therapy Time - 0900 - 0930 Previous goal was not met  Readiness to Learning:  He is receptive to learning  There are  no barriers to learning    Learning Assessment Time - 7558 - 1400   Education completed on  illness, aftercare and wellness tools  The teaching method was  verbal  Shared area of learning: Yes  MAURICE Simmons     (217) Allied Therapy 1834-6229 Artem Edwards actively shared in [facility] group focused on support  He engaged in hand chime task exploring concepts of the recovery model and interaction between responsibility, advocacy, and support  He was focused during music activity although his verbalizations remain loose in association  He identified resources for support and ways he could support himself this weekend including reviewing his coping skills  Group explored ways to ask for support  Weekend support resources reviewed  Some effort toward goal noted  Continue AT to reinforce personal role in supporting self and obtaining positive support from others  Treatment Plan Problem(s): 1 6  MAURICE Simmons       Case Management Note:   5032-4045 Artem Edwards met with this CM to discuss his goal last evening of contacting individual who has helped him in the past with job placement per pt's and ICM report  Artem Edwards stated he did not call but could not offer any explanation why he did not  He stated that he will call and leave a voice mail this afternoon, after Program  Discussed D/C for Tues 5/24/16 and Artem Edwards states he feels ready to be D/C'd  This CM encouraged Artem Edwards to plan a daily routine, with assist from his parents, to provide healthy structure to his day and opportunity to socialize outside the home with individuals his own age in addition to working PT eventually or TLC placement that may take time to happen  Pt was in agreement with this plan  TREATMENT SESSION NUMBER: 9   Current suicide risk is low  Medications not changed/added/denied  Dread Brooks RN      Active Problems    1  YISEL (generalized anxiety disorder) (300 02) (F41 1)   2  Hyperlipidemia (272 4) (E78 5)   3  Schizophrenia (295 90) (F20 9)   4   Strain of left foot, initial encounter (665 10) (V84 741Z)    Allergies    1  No Known Drug Allergies    Current Meds   1  Citalopram Hydrobromide 40 MG Oral Tablet; TAKE 1 TABLET DAILY; Therapy: 27GPV1788 to Recorded   2  ClonazePAM 0 5 MG Oral Tablet; TAKE 1 TABLET Twice daily PRN; Therapy: 16TGB6995 to (Evaluate:02Jun2015) Recorded   3  CloZAPine 100 MG Oral Tablet; TAKE 1 1/2  TABLET PO  Bedtime; Therapy: 26Apr2016 to (Wendy Burgos) Recorded    Family Psych History  Father    1  Family history of alcohol abuse (V61 41) (Z81 1)   2  Family history of Fibromyalgia  Maternal Grandmother    3  Family history of paranoid schizophrenia (V17 0) (Z81 8)  Paternal Uncle    4   Family history of paranoid schizophrenia (V17 0) (Z81 8)    Social History    · Education Level: Some college   · Never A Smoker   · Never Drank Alcohol   · Single    Signatures   Electronically signed by : Pau Simmons; May 20 2016 10:46AM EST                       (Author)    Electronically signed by : MAURICE Foreman; May 20 2016  2:34PM EST                       (Author)    Electronically signed by : Maribel Gonzalez RN; May 20 2016  4:29PM EST                       (Author)    Electronically signed by : Maribel Gonzalez RN; May 23 2016  9:58AM EST                       (Author)
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
## 2018-01-18 NOTE — RESULT NOTES
Verified Results  (1) COMPREHENSIVE METABOLIC PANEL [id number] 55:28ZU Silvia [facility] Order Number: [id number]     Test Name Result Flag Reference   SODIUM 140 mmol/L  136-145   POTASSIUM 3 8 mmol/L  3 5-5 3   CHLORIDE 105 mmol/L  100-108   CARBON DIOXIDE 28 mmol/L  21-32   ANION GAP (CALC) 7 mmol/L  4-13   BLOOD UREA NITROGEN 14 mg/dL  5-25   CREATININE 1 07 mg/dL  0 60-1 30   Standardized to IDMS reference method   CALCIUM 9 3 mg/dL  8 3-10 1   BILI, TOTAL 0 28 mg/dL  0 20-1 00   ALK PHOSPHATAS 103 U/L     ALT (SGPT) 22 U/L  12-78   Specimen collection should occur prior to Sulfasalazine and/or Sulfapyridine administration due to the potential for falsely depressed results  AST(SGOT) 15 U/L  5-45   Specimen collection should occur prior to Sulfasalazine administration due to the potential for falsely depressed results  ALBUMIN 3 6 g/dL  3 5-5 0   TOTAL PROTEIN 7 5 g/dL  6 4-8 2   eGFR 95 ml/min/1 73sq m     National Kidney Disease Education Program recommendations are as follows:  GFR calculation is accurate only with a steady state creatinine  Chronic Kidney disease less than 60 ml/min/1 73 sq  meters  Kidney failure less than 15 ml/min/1 73 sq  meters  GLUCOSE FASTING 109 mg/dL H 65-99   Specimen collection should occur prior to Sulfasalazine administration due to the potential for falsely depressed results  Specimen collection should occur prior to Sulfapyridine administration due to the potential for falsely elevated results  (1) TSH 11Aug2017 02:50PM June [facility] Order Number: [id number]     Test Name Result Flag Reference   TSH 1 140 uIU/mL  0 358-3 740   Patients undergoing fluorescein dye angiography may retain small amounts of fluorescein in the body for 48-72 hours post procedure  Samples containing fluorescein can produce falsely depressed TSH values  If the patient had this procedure,a specimen should be resubmitted post fluorescein clearance       (1) CBC/ PLT (NO DIFF) [id number] 02:50PM [facility] Order Number: [id number]     Test Name Result Flag Reference   HEMATOCRIT 41 7 %  36 5-49 3   HEMOGLOBIN 13 7 g/dL  12 0-17 0   MCHC 32 9 g/dL  31 4-37 4   MCH 29 2 pg  26 8-34 3   MCV 89 fL  82-98   PLATELET COUNT 457 Thousands/uL  149-390   RBC COUNT 4 69 Million/uL  3 88-5 62   RDW 14 5 %  11 6-15 1   WBC COUNT 7 01 Thousand/uL  4 31-10 16   MPV 11 1 fL  8 9-12 7     (1) LIPID PANEL, FASTING 11Aug2017 02:50PM Ronit Rommel   TW Order Number: [id number]     Test Name Result Flag Reference   CHOLESTEROL 216 mg/dL H    HDL,DIRECT 36 mg/dL L 40-60   Specimen collection should occur prior to Metamizole administration due to the potential for falsley depressed results  LDL CHOLESTEROL CALCULATED 133 mg/dL H 0-100   Triglyceride:        Normal ??? ??? ??? ??? ??? ??? ??? <150 mg/dl   ??? ??? ???Borderline High ??? ??? 150-199 mg/dl   ??? ??? ? ?? High ??? ??? ??? ??? ??? ??? ??? 200-499 mg/dl   ??? ??? ? ??Very High ??? ??? ??? ??? ??? >499 mg/dl      Cholesterol:       Desirable ??? ??? ??? ??? <200 mg/dl   ??? ??? Borderline High ??? 200-239 mg/dl   ??? ??? High ??? ??? ??? ??? ??? ??? >239 mg/dl      HDL Cholesterol:       High ??? ???>59 mg/dL   ??? ??? Low ??? ??? <41 mg/dL      This screening LDL is a calculated result  It does not have the accuracy of the Direct Measured LDL in the monitoring of patients with hyperlipidemia and/or statin therapy  Direct Measure LDL (RUA613) must be ordered separately in these patients  TRIGLYCERIDES 236 mg/dL H <=150   Specimen collection should occur prior to N-Acetylcysteine or Metamizole administration due to the potential for falsely depressed results

## 2018-01-18 NOTE — PSYCH
History of Present Illness  [facility] Clinical Progress Note [facility]:   Specialized Services Documentation - Therapist must complete separate progress note for each specific clinical activity in which the client participated during the day  (032) Group Psychotherapy: 6486-0301 Yasmani Rivers participated only during the introductory phase of psychotherapy group focused on determination  Although he seemed to be listening, he did not join in peer discussions  No progress towards goal  Continue group to offer opportunity to relate to peers around similar issues  Treatment Plan Problem(s): 1 1  Jil Potter [facility]     (093) Group Psychotherapy: 9767-8503 Yasmani Rivers participated in wellness group focused on assessment of weekly goal work in each area of functioning; including physical, emotional, cognitive, social and spiritual  Yasmani Rivers shared that his goal for this weekend and for the week ahead is to practice social skills as he feels he has very little socialization outside his family  Pt stated that he has difficulty opening up a conversation with people and "making friends " Several peers offered input on strategies to build social skills and empathized with Solo's struggle offering encouragement for building these skills throughout [facility] Program stay  Pt made good beginning progress was made toward goal  Continue group to educate and encourage patient to identify areas of functioning requiring ongoing attention for healthier functioning  Treatment Plan Problem(s): 1 1  Dalila Powers RN       (087) Education Therapy Goals set - work on his car    Treatment Plan Problem(s): 1 1  Education Therapy Time - 0900 - 0930 Previous goal was met  Readiness to Learning:  He is receptive to learning  There are  no barriers to learning  Learning Assessment Time - 1330 - 1400   Education completed on  illness, medication and wellness tools  The teaching method was  verbal  Shared area of learning: Yes   Elle Pearson Arta Hatchet, [facility]     (012) Allied Therapy 5465-4422 César Chang actively shared in [facility] group focused on WRAP development and use  He SPONTANEOUSLY engaged throughout group exploring key facets of recovery through purnima analysis and sections of the WRAP during group task  He shared examples of categories and took notes for his own WRAP  He was much more engaged, alert and on task than any prior group with this writer  Group explored the benefits of WRAP development and strategies to using this tool to support ongoing recovery  Some progress noted toward goal  Continue AT to explore recovery strategies and use  Treatment Plan Problem(s): 1 2  Latrice Tanmay, [facility]     ( ) Other 1400 CM spoke with pt's ICM, Mr Zapata Clink [phone] per pt's request (JESSE signed)  Mr Sae Ledezma stated that he has worked with César Chang, and his family, for some time now and confirmed that an application has been made for [facility] per pt's, and family's request and César Chang is now on W/L  Mr Sae Ledezma also mentioned that César Chang is forgetful at times and that this CM can remind him to contact [employer] Jacqueline at [email or web address] [phone] to discuss a PT job that César Chang has expressed interest in obtaining  Mr Sae Ledezma stated that César Chang has [employer]'s phone number but may have forgotten to contact him and he has been instrumental to César Chang in the past with job placement  Mr Sae Ledezma will contact CM again to follow Solo's progress in Program  Deric Han, RN     Case Management Note:   Tiffanie Lo met with CM to discuss progress in [facility] and WRAP completion  Pt will work on [employer] this weekend and understands how to complete  Pt discussed that he would like to exercise social skills more and make friends as he discussed in [facility] reviewed using "open questions" especially at car shows that he enjoys attending as well as taking the additional step in inviting someone to attend a car show or have something to eat after a show to build friendships   César Chang denies any AH, VH and states that things are going well, with no angry outbursts or aggression at home between himself and his father or mother  Pt denies any SI and HI ideas, plan or intent  Neha Gil started to take the increased dose of 150 mg Clozaril last evening and reports no SE  This CM contacted [address], at request of Dr Marina Cotton (JESSE signed) and they confirmed that pt has a prescription on file for blood lab draws for Clozaril monitoring, written by Dr Brenda Garcia, for every week so no additional RX's needed for labs at this time  TREATMENT SESSION NUMBER: 6   Current suicide risk is low  Medications changed/added/denied:  Tuyet Cornell RN      Active Problems    1  YISEL (generalized anxiety disorder) (300 02) (F41 1)   2  Hyperlipidemia (272 4) (E78 5)   3  Schizophrenia (295 90) (F20 9)   4  Strain of left foot, initial encounter (845 10) (J99 002R)    Allergies    1  No Known Drug Allergies    Current Meds   1  Citalopram Hydrobromide 40 MG Oral Tablet; TAKE 1 TABLET DAILY; Therapy: 84PWV9902 to Recorded   2  ClonazePAM 0 5 MG Oral Tablet; TAKE 1 TABLET Twice daily PRN; Therapy: 05VRA4305 to (Evaluate:02Jun2015) Recorded   3  CloZAPine 100 MG Oral Tablet; TAKE 1 1/2  TABLET PO  Bedtime; Therapy: 05Ocr1790 to (Vera Jha) Recorded    Family Psych History  Father    1  Family history of alcohol abuse (V61 41) (Z81 1)   2  Family history of Fibromyalgia  Maternal Grandmother    3  Family history of paranoid schizophrenia (V17 0) (Z81 8)  Paternal Uncle    4  Family history of paranoid schizophrenia (V17 0) (Z81 8)    Social History    · Education Level: Some college   · Never A Smoker   · Never Drank Alcohol   · Single    Future Appointments    Date/Time Provider Specialty Site   05/17/2016 10:30 AM VINNY Weiner  Psychiatry [facility] PARTIAL HOSPITALIZATION   05/18/2016 10:30 AM VINNY Weiner   Psychiatry [facility] PARTIAL HOSPITALIZATION   05/19/2016 10:30 AM Donavan VINNY Rangel  Psychiatry [facility] PARTIAL HOSPITALIZATION   05/16/2016 10:45 AM VINNY Starks   Psychiatry [facility] PARTIAL HOSPITALIZATION     Signatures   Electronically signed by : Fariha Alcantara RN; May 13 2016 12:11PM EST                       (Author)    Electronically signed by : Fariha Alcantara RN; May 13 2016 12:26PM EST                       (Author)    Electronically signed by : MAURICE Vitale; May 13 2016  2:59PM EST                       (Author)    Electronically signed by : Fariha Alcantara RN; May 13 2016  5:03PM EST                       (Author)

## 2018-01-22 VITALS
DIASTOLIC BLOOD PRESSURE: 80 MMHG | TEMPERATURE: 98.2 F | HEART RATE: 124 BPM | BODY MASS INDEX: 32.71 KG/M2 | OXYGEN SATURATION: 98 % | HEIGHT: 70 IN | WEIGHT: 228.5 LBS | SYSTOLIC BLOOD PRESSURE: 124 MMHG

## 2018-02-07 RX ORDER — CLONAZEPAM 0.5 MG/1
0.5 TABLET ORAL 2 TIMES DAILY PRN
COMMUNITY
Start: 2015-03-04

## 2018-02-07 RX ORDER — CITALOPRAM 40 MG/1
1 TABLET ORAL DAILY
COMMUNITY
Start: 2016-03-18 | End: 2018-05-10

## 2018-02-07 RX ORDER — CLOZAPINE 100 MG/1
TABLET ORAL
COMMUNITY
Start: 2016-04-26 | End: 2018-05-10

## 2018-02-12 ENCOUNTER — OFFICE VISIT (OUTPATIENT)
Dept: FAMILY MEDICINE CLINIC | Facility: CLINIC | Age: 28
End: 2018-02-12
Payer: MEDICARE

## 2018-02-12 VITALS
BODY MASS INDEX: 30.48 KG/M2 | TEMPERATURE: 96.8 F | HEIGHT: 72 IN | DIASTOLIC BLOOD PRESSURE: 84 MMHG | SYSTOLIC BLOOD PRESSURE: 124 MMHG | WEIGHT: 225 LBS

## 2018-02-12 DIAGNOSIS — E78.2 MIXED HYPERLIPIDEMIA: Primary | ICD-10-CM

## 2018-02-12 PROCEDURE — 99214 OFFICE O/P EST MOD 30 MIN: CPT | Performed by: FAMILY MEDICINE

## 2018-02-12 RX ORDER — CLONAZEPAM 0.5 MG/1
TABLET, ORALLY DISINTEGRATING ORAL
COMMUNITY
Start: 2016-09-09 | End: 2018-05-10

## 2018-02-12 RX ORDER — CITALOPRAM 40 MG/1
40 TABLET ORAL
COMMUNITY
Start: 2016-09-09 | End: 2018-05-10

## 2018-02-12 RX ORDER — CLOZAPINE 100 MG/1
100 TABLET ORAL
COMMUNITY
End: 2020-09-02

## 2018-02-12 NOTE — PATIENT INSTRUCTIONS
We discussed lab work results  I recommend diet exercise to try to control the cholesterol  We should recheck labs in about 3 months follow-up at that time  If necessary we can start medication

## 2018-02-12 NOTE — PROGRESS NOTES
Assessment/Plan:    Patient Instructions     We discussed lab work results  I recommend diet exercise to try to control the cholesterol  We should recheck labs in about 3 months follow-up at that time  If necessary we can start medication  Subjective:      Patient ID: Encarnacion Hamman is a 33 y o  male  He has been doing well  He does follow up with Dr Cecy ley and has lab work done every 3 months  He is trying to watch his diet and exercise  The following portions of the patient's history were reviewed and updated as appropriate: allergies, current medications, past family history, past medical history, past social history, past surgical history and problem list     Review of Systems   Constitutional: Negative  HENT: Negative  Eyes: Negative  Respiratory: Negative  Cardiovascular: Negative  Gastrointestinal: Negative  Endocrine: Negative  Genitourinary: Negative  Musculoskeletal: Negative  Skin: Negative  Allergic/Immunologic: Negative  Neurological: Negative  Hematological: Negative  Objective:    Vitals:    02/12/18 1213   BP: 124/84   Temp: (!) 96 8 °F (36 °C)        Physical Exam   Constitutional: He is oriented to person, place, and time  He appears well-developed  Eyes: EOM are normal  Pupils are equal, round, and reactive to light  Neck: Normal range of motion  Neck supple  Cardiovascular: Normal rate and regular rhythm  Pulmonary/Chest: Effort normal and breath sounds normal    Abdominal: Soft  Bowel sounds are normal    Musculoskeletal: Normal range of motion  Neurological: He is alert and oriented to person, place, and time  He has normal reflexes  Skin: Skin is warm and dry  Psychiatric: He has a normal mood and affect  His behavior is normal  Thought content normal    Nursing note and vitals reviewed
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
## 2018-05-10 ENCOUNTER — OFFICE VISIT (OUTPATIENT)
Dept: FAMILY MEDICINE CLINIC | Facility: CLINIC | Age: 28
End: 2018-05-10
Payer: MEDICARE

## 2018-05-10 VITALS
TEMPERATURE: 98.8 F | DIASTOLIC BLOOD PRESSURE: 70 MMHG | BODY MASS INDEX: 34.1 KG/M2 | SYSTOLIC BLOOD PRESSURE: 116 MMHG | HEIGHT: 69 IN | WEIGHT: 230.2 LBS

## 2018-05-10 DIAGNOSIS — E78.2 MIXED HYPERLIPIDEMIA: Primary | ICD-10-CM

## 2018-05-10 DIAGNOSIS — F20.0 SCHIZOPHRENIA, PARANOID TYPE (HCC): Chronic | ICD-10-CM

## 2018-05-10 DIAGNOSIS — F41.1 GAD (GENERALIZED ANXIETY DISORDER): ICD-10-CM

## 2018-05-10 PROCEDURE — 99214 OFFICE O/P EST MOD 30 MIN: CPT | Performed by: FAMILY MEDICINE

## 2018-05-10 RX ORDER — CITALOPRAM 20 MG/1
20 TABLET ORAL DAILY
COMMUNITY
End: 2020-09-02

## 2018-05-10 NOTE — PROGRESS NOTES
Assessment/Plan:    Continue current therapy  He will continue follow-up with a rise in house  He did have lab work last week I do not have those results yet  We will look for them from [employer]  Schizophrenia, paranoid type ([facility] )  HE IS A PATIENT WITH [facility]  THEY ARE CONTROLLING HIS MEDICATIONS  Hyperlipidemia  Controlling with diet and exercise  Diagnoses and all orders for this visit:    Mixed hyperlipidemia    Schizophrenia, paranoid type ([facility] )    YISEL (generalized anxiety disorder)    Other orders  -     citalopram (CeleXA) 20 mg tablet; Take 20 mg by mouth daily          Current Outpatient Prescriptions:     Cariprazine HCl (VRAYLAR) 1 5 MG CAPS, Take by mouth, Disp: , Rfl:     citalopram (CeleXA) 20 mg tablet, Take 20 mg by mouth daily, Disp: , Rfl:     clonazePAM (KlonoPIN) 0 5 mg tablet, Take 1 tablet by mouth 2 (two) times a day as needed, Disp: , Rfl:     cloZAPine (CLOZARIL) 100 mg tablet, Take 100 mg by mouth, Disp: , Rfl:     Subjective:      Patient ID: Teresa Marcano is a 33 y o  male  Patient presents with:  Medicare Wellness Visit  Follow-up: RTN 3 Month Check up , state when he woke up he had sharp pain in left knee      He is doing well  He is following up with a rise in house [employer]  He does complain of pain in his left knee that started this morning  The pain is described as sharp pain around the knee cap like something is tearing  The following portions of the patient's history were reviewed and updated as appropriate: allergies, current medications, past family history, past medical history, past social history, past surgical history and problem list     Review of Systems   Constitutional: Negative  HENT: Negative  Eyes: Negative  Respiratory: Negative  Cardiovascular: Negative  Gastrointestinal: Negative  Endocrine: Negative  Genitourinary: Negative      Musculoskeletal: Positive for arthralgias  Negative for back pain, gait problem, joint swelling, myalgias, neck pain and neck stiffness  Skin: Negative  Allergic/Immunologic: Negative  Neurological: Negative  Hematological: Negative  Psychiatric/Behavioral: Positive for hallucinations  Negative for agitation, behavioral problems, confusion, decreased concentration, dysphoric mood, self-injury, sleep disturbance and suicidal ideas  The patient is not nervous/anxious and is not hyperactive  All other systems reviewed and are negative  Objective:      /70 (BP Location: Right arm, Patient Position: Sitting, Cuff Size: Standard)   Temp 98 8 °F (37 1 °C) (Tympanic)   Ht 5' 9" (1 753 m)   Wt 104 kg (230 lb 3 2 oz)   BMI 33 99 kg/m²          Physical Exam   Constitutional: He is oriented to person, place, and time  He appears well-developed and well-nourished  HENT:   Head: Normocephalic and atraumatic  Right Ear: External ear normal    Left Ear: External ear normal    Nose: Nose normal    Mouth/Throat: Oropharynx is clear and moist    Eyes: Conjunctivae and EOM are normal  Pupils are equal, round, and reactive to light  Neck: Normal range of motion  Neck supple  Cardiovascular: Normal rate, regular rhythm and normal heart sounds  Pulmonary/Chest: Effort normal and breath sounds normal    Abdominal: Soft  Bowel sounds are normal    Musculoskeletal: Normal range of motion  Neurological: He is alert and oriented to person, place, and time  He has normal reflexes  Skin: Skin is warm and dry  Psychiatric: He has a normal mood and affect  His behavior is normal    Nursing note and vitals reviewed

## 2018-05-10 NOTE — ASSESSMENT & PLAN NOTE
HE IS A PATIENT WITH [facility]  THEY ARE CONTROLLING HIS MEDICATIONS
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
## 2020-04-01 ENCOUNTER — TELEMEDICINE (OUTPATIENT)
Dept: FAMILY MEDICINE CLINIC | Facility: CLINIC | Age: 30
End: 2020-04-01
Payer: MEDICARE

## 2020-04-01 ENCOUNTER — TELEPHONE (OUTPATIENT)
Dept: FAMILY MEDICINE CLINIC | Facility: CLINIC | Age: 30
End: 2020-04-01

## 2020-04-01 DIAGNOSIS — Z20.828 EXPOSURE TO SARS-ASSOCIATED CORONAVIRUS: Primary | ICD-10-CM

## 2020-04-01 DIAGNOSIS — J06.9 ACUTE UPPER RESPIRATORY INFECTION: ICD-10-CM

## 2020-04-01 DIAGNOSIS — Z20.828 EXPOSURE TO SARS-ASSOCIATED CORONAVIRUS: ICD-10-CM

## 2020-04-01 PROCEDURE — 99441 PR PHYS/QHP TELEPHONE EVALUATION 5-10 MIN: CPT | Performed by: FAMILY MEDICINE

## 2020-04-01 PROCEDURE — 87635 SARS-COV-2 COVID-19 AMP PRB: CPT

## 2020-04-01 RX ORDER — CLOZAPINE 25 MG/1
25 TABLET ORAL DAILY
COMMUNITY
Start: 2020-03-19

## 2020-04-01 RX ORDER — AMOXICILLIN 500 MG/1
500 TABLET, FILM COATED ORAL 3 TIMES DAILY
Qty: 21 TABLET | Refills: 0 | Status: SHIPPED | OUTPATIENT
Start: 2020-04-01 | End: 2020-04-08

## 2020-04-01 RX ORDER — CITALOPRAM 40 MG/1
40 TABLET ORAL DAILY
COMMUNITY
Start: 2020-03-17

## 2020-04-05 LAB — SARS-COV-2 RNA SPEC QL NAA+PROBE: NOT DETECTED

## 2020-04-06 ENCOUNTER — TELEPHONE (OUTPATIENT)
Dept: FAMILY MEDICINE CLINIC | Facility: CLINIC | Age: 30
End: 2020-04-06

## 2020-05-26 ENCOUNTER — TELEPHONE (OUTPATIENT)
Dept: FAMILY MEDICINE CLINIC | Facility: CLINIC | Age: 30
End: 2020-05-26

## 2020-09-02 ENCOUNTER — TELEMEDICINE (OUTPATIENT)
Dept: FAMILY MEDICINE CLINIC | Facility: CLINIC | Age: 30
End: 2020-09-02
Payer: MEDICARE

## 2020-09-02 DIAGNOSIS — R05.8 COUGH WITH EXPOSURE TO COVID-19 VIRUS: Primary | ICD-10-CM

## 2020-09-02 DIAGNOSIS — Z20.822 COUGH WITH EXPOSURE TO COVID-19 VIRUS: Primary | ICD-10-CM

## 2020-09-02 DIAGNOSIS — R05.8 COUGH WITH EXPOSURE TO COVID-19 VIRUS: ICD-10-CM

## 2020-09-02 DIAGNOSIS — Z20.822 COUGH WITH EXPOSURE TO COVID-19 VIRUS: ICD-10-CM

## 2020-09-02 PROCEDURE — U0003 INFECTIOUS AGENT DETECTION BY NUCLEIC ACID (DNA OR RNA); SEVERE ACUTE RESPIRATORY SYNDROME CORONAVIRUS 2 (SARS-COV-2) (CORONAVIRUS DISEASE [COVID-19]), AMPLIFIED PROBE TECHNIQUE, MAKING USE OF HIGH THROUGHPUT TECHNOLOGIES AS DESCRIBED BY CMS-2020-01-R: HCPCS | Performed by: FAMILY MEDICINE

## 2020-09-02 PROCEDURE — 99213 OFFICE O/P EST LOW 20 MIN: CPT | Performed by: FAMILY MEDICINE

## 2020-09-02 RX ORDER — AMOXICILLIN 500 MG/1
500 TABLET, FILM COATED ORAL 3 TIMES DAILY
Qty: 21 TABLET | Refills: 0 | Status: SHIPPED | OUTPATIENT
Start: 2020-09-02 | End: 2020-09-09
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
## 2020-09-03 LAB — SARS-COV-2 RNA SPEC QL NAA+PROBE: NOT DETECTED

## 2021-02-17 ENCOUNTER — OFFICE VISIT (OUTPATIENT)
Dept: FAMILY MEDICINE CLINIC | Facility: CLINIC | Age: 31
End: 2021-02-17
Payer: MEDICARE

## 2021-02-17 VITALS
WEIGHT: 243.4 LBS | BODY MASS INDEX: 34.07 KG/M2 | DIASTOLIC BLOOD PRESSURE: 78 MMHG | SYSTOLIC BLOOD PRESSURE: 120 MMHG | HEIGHT: 71 IN | TEMPERATURE: 98.7 F

## 2021-02-17 DIAGNOSIS — R22.9 SKIN MASS: Primary | ICD-10-CM

## 2021-02-17 DIAGNOSIS — F20.0 SCHIZOPHRENIA, PARANOID TYPE (HCC): ICD-10-CM

## 2021-02-17 PROCEDURE — 99213 OFFICE O/P EST LOW 20 MIN: CPT | Performed by: FAMILY MEDICINE

## 2021-02-17 RX ORDER — TRIAMCINOLONE ACETONIDE 1 MG/G
CREAM TOPICAL 2 TIMES DAILY
Qty: 30 G | Refills: 0 | Status: SHIPPED | OUTPATIENT
Start: 2021-02-17

## 2021-02-18 NOTE — PROGRESS NOTES
Assessment/Plan:     60-year-old male with:  Skin mass and schizophrenia encouraged close follow-up with Psychiatry and continue current medications will give topical steroid for the discomfort but refer to surgeon for excision of lesion discussed supportive care return parameters otherwise    No problem-specific Assessment & Plan notes found for this encounter  Diagnoses and all orders for this visit:    Skin mass  -     Ambulatory referral to General Surgery; Future  -     triamcinolone (KENALOG) 0 1 % cream; Apply topically 2 (two) times a day    [facility] ([facility] )          Subjective:     Chief Complaint   Patient presents with    Mass     Patient is omplaining of a growth on his anus  Pt has no pain just discomfort  Pt states the growth started bleeding on 2/14  Patient ID: Michelle Gray is a 35 y o  male  Patient is a 60-year-old male who presents with his father for follow-up  He has schizophrenia admits being very well controlled on his medications and follows with a psychiatrist regularly  He has a mass and is gluteal fold that has become itchy and irritated over the past several weeks he would like it removed no fevers chills nausea vomiting tolerating p o  intake no trauma to the area      The following portions of the patient's history were reviewed and updated as appropriate: allergies, current medications, past family history, past medical history, past social history, past surgical history and problem list     Review of Systems   Constitutional: Negative  HENT: Negative  Eyes: Negative  Respiratory: Negative  Cardiovascular: Negative  Gastrointestinal: Negative  Endocrine: Negative  Genitourinary: Negative  Musculoskeletal: Negative  Allergic/Immunologic: Negative  Neurological: Negative  Hematological: Negative  Psychiatric/Behavioral: Negative  All other systems reviewed and are negative          Objective:      /78 (BP Location: Right arm, Patient Position: Sitting, Cuff Size: Standard)   Temp 98 7 °F (37 1 °C) (Tympanic)   Ht 5' 11" (1 803 m)   Wt 110 kg (243 lb 6 4 oz)   BMI 33 95 kg/m²          Physical Exam  Constitutional:       Appearance: He is well-developed  HENT:      Head: Atraumatic  Right Ear: External ear normal       Left Ear: External ear normal    Eyes:      Conjunctiva/sclera: Conjunctivae normal       Pupils: Pupils are equal, round, and reactive to light  Neck:      Musculoskeletal: Normal range of motion  Pulmonary:      Effort: Pulmonary effort is normal  No respiratory distress  Abdominal:      General: There is no distension  Musculoskeletal: Normal range of motion  Skin:     General: Skin is warm and dry  Comments:  3 cm lesion protruding from the superior gluteal fold   Neurological:      Mental Status: He is alert and oriented to person, place, and time  Cranial Nerves: No cranial nerve deficit  Psychiatric:         Behavior: Behavior normal          Thought Content: Thought content normal          Judgment: Judgment normal            BMI Counseling: Body mass index is 33 95 kg/m²  The BMI is above normal  Nutrition recommendations include encouraging healthy choices of fruits and vegetables  Exercise recommendations include moderate physical activity 150 minutes/week

## 2021-02-22 ENCOUNTER — CONSULT (OUTPATIENT)
Dept: SURGERY | Facility: CLINIC | Age: 31
End: 2021-02-22
Payer: MEDICARE

## 2021-02-22 VITALS
HEART RATE: 114 BPM | DIASTOLIC BLOOD PRESSURE: 80 MMHG | BODY MASS INDEX: 34.19 KG/M2 | HEIGHT: 71 IN | WEIGHT: 244.2 LBS | TEMPERATURE: 97.5 F | SYSTOLIC BLOOD PRESSURE: 120 MMHG

## 2021-02-22 DIAGNOSIS — R22.9 SKIN MASS: ICD-10-CM

## 2021-02-22 DIAGNOSIS — L05.01 PILONIDAL ABSCESS OF NATAL CLEFT: Primary | ICD-10-CM

## 2021-02-22 PROCEDURE — 99203 OFFICE O/P NEW LOW 30 MIN: CPT | Performed by: SURGERY
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
## 2021-02-23 ENCOUNTER — TELEPHONE (OUTPATIENT)
Dept: SURGERY | Facility: CLINIC | Age: 31
End: 2021-02-23

## 2021-02-23 NOTE — TELEPHONE ENCOUNTER
Called [facility] for patient    COURTESY CALL-    Type Appt- Consult, New Patient  Date- 2/22/21  DX- Pilonidal Cyst  Surgery- Excision pilonidal cyst, poss wound vac placement    Asked patient to call office with any questions regarding plan of care/

## 2021-03-09 ENCOUNTER — TELEPHONE (OUTPATIENT)
Dept: SURGERY | Facility: CLINIC | Age: 31
End: 2021-03-09

## 2021-03-09 NOTE — TELEPHONE ENCOUNTER
Received call from [place] at [facility]  She has questions on wound vac order received for patient      Please return her call to [phone] x [phone]

## 2021-03-18 ENCOUNTER — OFFICE VISIT (OUTPATIENT)
Dept: SURGERY | Facility: CLINIC | Age: 31
End: 2021-03-18

## 2021-03-18 VITALS
HEART RATE: 117 BPM | WEIGHT: 246.6 LBS | TEMPERATURE: 97.6 F | DIASTOLIC BLOOD PRESSURE: 80 MMHG | SYSTOLIC BLOOD PRESSURE: 118 MMHG | HEIGHT: 71 IN | BODY MASS INDEX: 34.52 KG/M2

## 2021-03-18 DIAGNOSIS — L05.91 PILONIDAL CYST: Primary | ICD-10-CM

## 2021-03-18 PROCEDURE — PREOP: Performed by: PHYSICIAN ASSISTANT
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
## 2021-03-19 DIAGNOSIS — L05.91 PILONIDAL CYST: ICD-10-CM

## 2021-03-19 PROCEDURE — U0005 INFEC AGEN DETEC AMPLI PROBE: HCPCS | Performed by: PHYSICIAN ASSISTANT

## 2021-03-19 PROCEDURE — U0003 INFECTIOUS AGENT DETECTION BY NUCLEIC ACID (DNA OR RNA); SEVERE ACUTE RESPIRATORY SYNDROME CORONAVIRUS 2 (SARS-COV-2) (CORONAVIRUS DISEASE [COVID-19]), AMPLIFIED PROBE TECHNIQUE, MAKING USE OF HIGH THROUGHPUT TECHNOLOGIES AS DESCRIBED BY CMS-2020-01-R: HCPCS | Performed by: PHYSICIAN ASSISTANT

## 2021-03-20 LAB — SARS-COV-2 RNA RESP QL NAA+PROBE: NEGATIVE

## 2021-03-22 ENCOUNTER — ANESTHESIA EVENT (OUTPATIENT)
Dept: PERIOP | Facility: HOSPITAL | Age: 31
End: 2021-03-22
Payer: MEDICARE

## 2021-03-22 NOTE — PRE-PROCEDURE INSTRUCTIONS
Pre-Surgery Instructions:   Medication Instructions    Cariprazine HCl (VRAYLAR) 1 5 MG CAPS Instructed patient per Anesthesia Guidelines   citalopram (CeleXA) 40 mg tablet Instructed patient per Anesthesia Guidelines   clonazePAM (KlonoPIN) 0 5 mg tablet Instructed patient per Anesthesia Guidelines   cloZAPine (CLOZARIL) 25 mg tablet Instructed patient per Anesthesia Guidelines   triamcinolone (KENALOG) 0 1 % cream Instructed patient per Anesthesia Guidelines  Patient may take vraylar, celexa, Klonopin, clozaril DOS  Instructed no NSAIDs, aspirins, vitamins, supplements prior to surgery

## 2021-03-24 ENCOUNTER — HOSPITAL ENCOUNTER (OUTPATIENT)
Facility: HOSPITAL | Age: 31
Setting detail: OUTPATIENT SURGERY
Discharge: HOME HEALTH | End: 2021-03-25
Attending: SURGERY | Admitting: SURGERY
Payer: MEDICARE

## 2021-03-24 ENCOUNTER — ANESTHESIA (OUTPATIENT)
Dept: PERIOP | Facility: HOSPITAL | Age: 31
End: 2021-03-24
Payer: MEDICARE

## 2021-03-24 DIAGNOSIS — L05.01 PILONIDAL CYST WITH ABSCESS: Primary | ICD-10-CM

## 2021-03-24 DIAGNOSIS — L05.91 PILONIDAL CYST: ICD-10-CM

## 2021-03-24 PROCEDURE — 87176 TISSUE HOMOGENIZATION CULTR: CPT | Performed by: SURGERY

## 2021-03-24 PROCEDURE — 87205 SMEAR GRAM STAIN: CPT | Performed by: SURGERY

## 2021-03-24 PROCEDURE — 88304 TISSUE EXAM BY PATHOLOGIST: CPT | Performed by: PATHOLOGY

## 2021-03-24 PROCEDURE — 11770 EXC PILONIDAL CYST SIMPLE: CPT | Performed by: SURGERY

## 2021-03-24 PROCEDURE — 87075 CULTR BACTERIA EXCEPT BLOOD: CPT | Performed by: SURGERY

## 2021-03-24 PROCEDURE — 87070 CULTURE OTHR SPECIMN AEROBIC: CPT | Performed by: SURGERY

## 2021-03-24 RX ORDER — CEFAZOLIN SODIUM 2 G/50ML
2000 SOLUTION INTRAVENOUS ONCE
Status: COMPLETED | OUTPATIENT
Start: 2021-03-24 | End: 2021-03-24

## 2021-03-24 RX ORDER — MAGNESIUM HYDROXIDE 1200 MG/15ML
LIQUID ORAL AS NEEDED
Status: DISCONTINUED | OUTPATIENT
Start: 2021-03-24 | End: 2021-03-24 | Stop reason: HOSPADM

## 2021-03-24 RX ORDER — CITALOPRAM 20 MG/1
40 TABLET ORAL DAILY
Status: DISCONTINUED | OUTPATIENT
Start: 2021-03-25 | End: 2021-03-25 | Stop reason: HOSPADM

## 2021-03-24 RX ORDER — FENTANYL CITRATE/PF 50 MCG/ML
25 SYRINGE (ML) INJECTION
Status: DISCONTINUED | OUTPATIENT
Start: 2021-03-24 | End: 2021-03-24 | Stop reason: HOSPADM

## 2021-03-24 RX ORDER — GLYCOPYRROLATE 0.2 MG/ML
INJECTION INTRAMUSCULAR; INTRAVENOUS AS NEEDED
Status: DISCONTINUED | OUTPATIENT
Start: 2021-03-24 | End: 2021-03-24

## 2021-03-24 RX ORDER — FENTANYL CITRATE 50 UG/ML
INJECTION, SOLUTION INTRAMUSCULAR; INTRAVENOUS AS NEEDED
Status: DISCONTINUED | OUTPATIENT
Start: 2021-03-24 | End: 2021-03-24

## 2021-03-24 RX ORDER — MIDAZOLAM HYDROCHLORIDE 2 MG/2ML
INJECTION, SOLUTION INTRAMUSCULAR; INTRAVENOUS AS NEEDED
Status: DISCONTINUED | OUTPATIENT
Start: 2021-03-24 | End: 2021-03-24

## 2021-03-24 RX ORDER — MEPERIDINE HYDROCHLORIDE 25 MG/ML
12.5 INJECTION INTRAMUSCULAR; INTRAVENOUS; SUBCUTANEOUS
Status: DISCONTINUED | OUTPATIENT
Start: 2021-03-24 | End: 2021-03-24 | Stop reason: HOSPADM

## 2021-03-24 RX ORDER — LIDOCAINE HYDROCHLORIDE 10 MG/ML
INJECTION, SOLUTION EPIDURAL; INFILTRATION; INTRACAUDAL; PERINEURAL AS NEEDED
Status: DISCONTINUED | OUTPATIENT
Start: 2021-03-24 | End: 2021-03-24

## 2021-03-24 RX ORDER — ONDANSETRON 2 MG/ML
INJECTION INTRAMUSCULAR; INTRAVENOUS AS NEEDED
Status: DISCONTINUED | OUTPATIENT
Start: 2021-03-24 | End: 2021-03-24

## 2021-03-24 RX ORDER — NEOSTIGMINE METHYLSULFATE 1 MG/ML
INJECTION INTRAVENOUS AS NEEDED
Status: DISCONTINUED | OUTPATIENT
Start: 2021-03-24 | End: 2021-03-24

## 2021-03-24 RX ORDER — SODIUM CHLORIDE, SODIUM LACTATE, POTASSIUM CHLORIDE, CALCIUM CHLORIDE 600; 310; 30; 20 MG/100ML; MG/100ML; MG/100ML; MG/100ML
125 INJECTION, SOLUTION INTRAVENOUS CONTINUOUS
Status: DISCONTINUED | OUTPATIENT
Start: 2021-03-24 | End: 2021-03-24

## 2021-03-24 RX ORDER — CLOZAPINE 25 MG/1
25 TABLET ORAL DAILY
Status: DISCONTINUED | OUTPATIENT
Start: 2021-03-25 | End: 2021-03-25 | Stop reason: HOSPADM

## 2021-03-24 RX ORDER — ONDANSETRON 2 MG/ML
4 INJECTION INTRAMUSCULAR; INTRAVENOUS ONCE AS NEEDED
Status: DISCONTINUED | OUTPATIENT
Start: 2021-03-24 | End: 2021-03-24 | Stop reason: HOSPADM

## 2021-03-24 RX ORDER — DEXAMETHASONE SODIUM PHOSPHATE 10 MG/ML
INJECTION, SOLUTION INTRAMUSCULAR; INTRAVENOUS AS NEEDED
Status: DISCONTINUED | OUTPATIENT
Start: 2021-03-24 | End: 2021-03-24

## 2021-03-24 RX ORDER — HYDROMORPHONE HCL/PF 1 MG/ML
0.5 SYRINGE (ML) INJECTION
Status: DISCONTINUED | OUTPATIENT
Start: 2021-03-24 | End: 2021-03-24 | Stop reason: HOSPADM

## 2021-03-24 RX ORDER — HYDROMORPHONE HCL 110MG/55ML
PATIENT CONTROLLED ANALGESIA SYRINGE INTRAVENOUS AS NEEDED
Status: DISCONTINUED | OUTPATIENT
Start: 2021-03-24 | End: 2021-03-24

## 2021-03-24 RX ORDER — OXYCODONE HYDROCHLORIDE 5 MG/1
5 TABLET ORAL EVERY 4 HOURS PRN
Status: DISCONTINUED | OUTPATIENT
Start: 2021-03-24 | End: 2021-03-25 | Stop reason: HOSPADM

## 2021-03-24 RX ORDER — ROCURONIUM BROMIDE 10 MG/ML
INJECTION, SOLUTION INTRAVENOUS AS NEEDED
Status: DISCONTINUED | OUTPATIENT
Start: 2021-03-24 | End: 2021-03-24

## 2021-03-24 RX ORDER — ONDANSETRON 4 MG/1
4 TABLET, ORALLY DISINTEGRATING ORAL EVERY 6 HOURS PRN
Status: DISCONTINUED | OUTPATIENT
Start: 2021-03-24 | End: 2021-03-25 | Stop reason: HOSPADM

## 2021-03-24 RX ORDER — SODIUM CHLORIDE 9 MG/ML
INJECTION, SOLUTION INTRAVENOUS CONTINUOUS PRN
Status: DISCONTINUED | OUTPATIENT
Start: 2021-03-24 | End: 2021-03-24

## 2021-03-24 RX ORDER — HYDROMORPHONE HCL/PF 1 MG/ML
0.5 SYRINGE (ML) INJECTION EVERY 4 HOURS PRN
Status: DISCONTINUED | OUTPATIENT
Start: 2021-03-24 | End: 2021-03-25 | Stop reason: HOSPADM

## 2021-03-24 RX ORDER — HYDROCODONE BITARTRATE AND ACETAMINOPHEN 5; 325 MG/1; MG/1
1 TABLET ORAL EVERY 6 HOURS PRN
Status: DISCONTINUED | OUTPATIENT
Start: 2021-03-24 | End: 2021-03-24

## 2021-03-24 RX ORDER — CLONAZEPAM 0.5 MG/1
0.5 TABLET ORAL 2 TIMES DAILY PRN
Status: DISCONTINUED | OUTPATIENT
Start: 2021-03-24 | End: 2021-03-25 | Stop reason: HOSPADM

## 2021-03-24 RX ORDER — PROPOFOL 10 MG/ML
INJECTION, EMULSION INTRAVENOUS AS NEEDED
Status: DISCONTINUED | OUTPATIENT
Start: 2021-03-24 | End: 2021-03-24

## 2021-03-24 RX ADMIN — HYDROMORPHONE HYDROCHLORIDE 0.5 MG: 2 INJECTION INTRAMUSCULAR; INTRAVENOUS; SUBCUTANEOUS at 17:25

## 2021-03-24 RX ADMIN — SODIUM CHLORIDE: 0.9 INJECTION, SOLUTION INTRAVENOUS at 17:41

## 2021-03-24 RX ADMIN — CEFAZOLIN SODIUM 2000 MG: 2 SOLUTION INTRAVENOUS at 16:42

## 2021-03-24 RX ADMIN — PHENYLEPHRINE HYDROCHLORIDE 200 MCG: 10 INJECTION INTRAVENOUS at 17:09

## 2021-03-24 RX ADMIN — ROCURONIUM BROMIDE 30 MG: 10 INJECTION, SOLUTION INTRAVENOUS at 16:58

## 2021-03-24 RX ADMIN — PHENYLEPHRINE HYDROCHLORIDE 100 MCG: 10 INJECTION INTRAVENOUS at 17:15

## 2021-03-24 RX ADMIN — PROPOFOL 300 MG: 10 INJECTION, EMULSION INTRAVENOUS at 16:57

## 2021-03-24 RX ADMIN — GLYCOPYRROLATE 0.8 MG: 0.2 INJECTION, SOLUTION INTRAMUSCULAR; INTRAVENOUS at 17:30

## 2021-03-24 RX ADMIN — DEXAMETHASONE SODIUM PHOSPHATE 4 MG: 10 INJECTION, SOLUTION INTRAMUSCULAR; INTRAVENOUS at 17:10

## 2021-03-24 RX ADMIN — FENTANYL CITRATE 100 MCG: 50 INJECTION, SOLUTION INTRAMUSCULAR; INTRAVENOUS at 17:41

## 2021-03-24 RX ADMIN — LIDOCAINE HYDROCHLORIDE 100 MG: 10 INJECTION, SOLUTION EPIDURAL; INFILTRATION; INTRACAUDAL; PERINEURAL at 16:57

## 2021-03-24 RX ADMIN — MIDAZOLAM 2 MG: 1 INJECTION INTRAMUSCULAR; INTRAVENOUS at 16:46

## 2021-03-24 RX ADMIN — SODIUM CHLORIDE, SODIUM LACTATE, POTASSIUM CHLORIDE, AND CALCIUM CHLORIDE 125 ML/HR: .6; .31; .03; .02 INJECTION, SOLUTION INTRAVENOUS at 13:23

## 2021-03-24 RX ADMIN — ONDANSETRON 4 MG: 2 INJECTION INTRAMUSCULAR; INTRAVENOUS at 17:20

## 2021-03-24 RX ADMIN — PHENYLEPHRINE HYDROCHLORIDE 100 MCG: 10 INJECTION INTRAVENOUS at 17:14

## 2021-03-24 RX ADMIN — NEOSTIGMINE METHYLSULFATE 5 MG: 1 INJECTION INTRAVENOUS at 17:30

## 2021-03-24 RX ADMIN — FENTANYL CITRATE 100 MCG: 50 INJECTION, SOLUTION INTRAMUSCULAR; INTRAVENOUS at 16:57

## 2021-03-24 NOTE — ANESTHESIA PREPROCEDURE EVALUATION
Procedure:  EXCISION PILONIDAL CYST (N/A Buttocks)    Relevant Problems   CARDIO   (+) Hyperlipidemia      NEURO/PSYCH   (+) YISEL (generalized anxiety disorder)   (+) Schizophrenia, paranoid type ([facility] [address] )      Other   (+) Cough with exposure to COVID-19 virus        Physical Exam    Airway    Mallampati score: II  TM Distance: >3 FB  Neck ROM: full     Dental       Cardiovascular  Rhythm: regular, Rate: normal, Cardiovascular exam normal    Pulmonary  Pulmonary exam normal Breath sounds clear to auscultation,     Other Findings        Anesthesia Plan  ASA Score- 2     Anesthesia Type- general with ASA Monitors  Additional Monitors:   Airway Plan: ETT  Plan Factors-    Chart reviewed  Patient summary reviewed  Patient is not a current smoker  Patient instructed to abstain from smoking on day of procedure  Patient did not smoke on day of surgery  Induction- intravenous  Postoperative Plan-     Informed Consent- Anesthetic plan and risks discussed with patient and mother

## 2021-03-24 NOTE — ANESTHESIA POSTPROCEDURE EVALUATION
Post-Op Assessment Note    CV Status:  Stable  Pain Score: 2    Pain management: adequate     Mental Status:  Alert and awake   Hydration Status:  Euvolemic   PONV Controlled:  Controlled   Airway Patency:  Patent      Post Op Vitals Reviewed: Yes      Staff: Anesthesiologist         No complications documented      /59 (03/24/21 1810)    Temp 98 °F (36 7 °C) (03/24/21 1810)    Pulse 98 (03/24/21 1810)   Resp 14 (03/24/21 1810)    SpO2 96 % (03/24/21 1810)

## 2021-03-24 NOTE — OP NOTE
EXCISION PILONIDAL CYST  Postoperative Note  PATIENT NAME: Kat Day  : 1990  MRN: [id number]  [facility] OR ROOM 08    Surgery Date: 3/24/2021    Pilonidal cyst [L05 ]    Operative Indications:  Pilonidal Cyst - chronically inflammed      Consent:  The risks, benefits, and alternatives to the surgery were discussed with the patient and with the family prior to surgery, personally by Dr Viv White  If the consent was obtained by the physician assistant or other representative, the consent was reviewed once again personally by the operating physician  Common complications particular for this procedure as well as unusual complications were discussed, including but not limited to:  bleeding, wound infection, prolonged wound healing, open wounds, reoperation, leak from the bowel or viscus, leak from the bile duct or injury to adjacent or other organs or blood vessels in the abdomen  The likelihood of reoperation,  open wounds and prolonged wound healing was discussed at length and this is considered a high risk for this type of procedure and a high probability of occurrence  A  was used if necessary  The patient expressed understanding of the issues discussed and wished and consented to the procedure to proceed  All questions were answered  Dr Viv White personally discussed the informed consent with this patient  Operative Findings:  6x4x3 cavity  2 full length 2" paul packing w betadine     Post-Op Diagnosis Codes:     * Pilonidal cyst [L05 91]    Procedure(s):  EXCISION PILONIDAL CYST infected   Surgeon(s) and Role:     * Cinthya Olvera MD - Primary     * Petrona Rodriguez DO - Assisting    The assistant was medically necessary for surgical safety the case including suturing, retraction, and hemostasis  A qualified resident was available  I provided direct and immediate supervision  I was present for the entire procedure         Drains:  * No LDAs found *    Specimens:  ID Type Source Tests Collected by Time Destination   1 :  Tissue Pilonidal Cyst/Sinus TISSUE EXAM Michael Gil MD 3/24/2021 1723    A : cultures Tissue Pilonidal Cyst/Sinus ANAEROBIC CULTURE AND GRAM STAIN, CULTURE, TISSUE AND GRAM STAIN Michael Gil MD 3/24/2021 1720        Estimated Blood Loss:   Minimal    Anesthesia Type:   Choice     Procedure: The patient is brought to the operating room and identified  Location of the procedure site was confirmed and marked with the patient  Staff confirmed patient name, , procedure, and site  The patient underwent general anesthesia under the direction of the anesthesia department and was placed in the jackknife position  Area was shaved, prepped and draped in a sterile fashion  A time-out was performed  Local anesthesia was used  An elliptical incision was made around the old cyst      Scissors, knife and cautery were used to excise the cyst      Ultimately, it measured 6x4x4 cm with 1 cm margins  The wound was irrigated  Hemostasis was assured  the wound was packed with two full length 2" length paul moistened with betadine       Skin, soft tissue and fat were removed with the specimen  The lesion removed was removed down to clean, healthy, mildly bleeding, subcutaneous tissue  There  was purulence in the cyst cavity  If so, this cultured was  sent for microbiology  The wound was dressed  The patient tolerated the procedure well and was taken to recovery  Some portions of this record may have been generated with voice recognition software  There may be translation, syntax,  or grammatical errors  Occasional wrong word or "sound-a-like" substitutions may have occurred due to the inherent limitations of the voice recognition software  Read the chart carefully and recognize, using context, where substations may have occurred   If you have any questions, please contact the dictating provider for clarification or correction, as needed       Complications: None    Condition: Stable to PACU    SIGNATURE: Mercedez Dietz MD   DATE: March 24, 2021   TIME: 5:35 PM

## 2021-03-24 NOTE — INTERVAL H&P NOTE
H&P reviewed  After examining the patient I find no changes in the patients condition since the H&P had been written      Vitals:    03/24/21 1307   BP: 127/81   Pulse: (!) 107   Temp: 97 6 °F (36 4 °C)   SpO2: 97%

## 2021-03-25 ENCOUNTER — TELEPHONE (OUTPATIENT)
Dept: SURGERY | Facility: CLINIC | Age: 31
End: 2021-03-25

## 2021-03-25 ENCOUNTER — NURSE TRIAGE (OUTPATIENT)
Dept: OTHER | Facility: OTHER | Age: 31
End: 2021-03-25

## 2021-03-25 VITALS
HEART RATE: 86 BPM | HEIGHT: 72 IN | BODY MASS INDEX: 33.05 KG/M2 | OXYGEN SATURATION: 98 % | WEIGHT: 244 LBS | SYSTOLIC BLOOD PRESSURE: 115 MMHG | RESPIRATION RATE: 18 BRPM | DIASTOLIC BLOOD PRESSURE: 71 MMHG | TEMPERATURE: 97.9 F

## 2021-03-25 PROBLEM — L05.01 PILONIDAL CYST WITH ABSCESS: Status: RESOLVED | Noted: 2021-03-24 | Resolved: 2021-03-25

## 2021-03-25 PROCEDURE — 99024 POSTOP FOLLOW-UP VISIT: CPT | Performed by: SURGERY

## 2021-03-25 PROCEDURE — NC001 PR NO CHARGE: Performed by: SURGERY

## 2021-03-25 RX ADMIN — CITALOPRAM HYDROBROMIDE 40 MG: 20 TABLET ORAL at 08:54

## 2021-03-25 RX ADMIN — OXYCODONE 5 MG: 5 TABLET ORAL at 08:54

## 2021-03-25 RX ADMIN — CLOZAPINE 25 MG: 25 TABLET ORAL at 08:54

## 2021-03-25 NOTE — PLAN OF CARE
Problem: SKIN/TISSUE INTEGRITY - ADULT  Goal: Skin integrity remains intact  Description: INTERVENTIONS  - Identify patients at risk for skin breakdown  - Assess and monitor skin integrity  - Assess and monitor nutrition and hydration status  - Monitor labs (i e  albumin)  - Assess for incontinence   - Turn and reposition patient  - Assist with mobility/ambulation  - Relieve pressure over bony prominences  - Avoid friction and shearing  - Provide appropriate hygiene as needed including keeping skin clean and dry  - Evaluate need for skin moisturizer/barrier cream  - Collaborate with interdisciplinary team (i e  Nutrition, Rehabilitation, etc )   - Patient/family teaching  Outcome: Progressing  Goal: Incision(s), wounds(s) or drain site(s) healing without S/S of infection  Description: INTERVENTIONS  - Assess and document risk factors for skin impairment   - Assess and document dressing, incision, wound bed, drain sites and surrounding tissue  - Consider nutrition services referral as needed  - Oral mucous membranes remain intact  - Provide patient/ family education  Outcome: Progressing  Goal: Oral mucous membranes remain intact  Description: INTERVENTIONS  - Assess oral mucosa and hygiene practices  - Implement preventative oral hygiene regimen  - Implement oral medicated treatments as ordered  - Initiate Nutrition services referral as needed  Outcome: Progressing     Problem: PAIN - ADULT  Goal: Verbalizes/displays adequate comfort level or baseline comfort level  Description: Interventions:  - Encourage patient to monitor pain and request assistance  - Assess pain using appropriate pain scale  - Administer analgesics based on type and severity of pain and evaluate response  - Implement non-pharmacological measures as appropriate and evaluate response  - Consider cultural and social influences on pain and pain management  - Notify physician/advanced practitioner if interventions unsuccessful or patient reports new pain  Outcome: Progressing     Problem: SAFETY ADULT  Goal: Patient will remain free of falls  Description: INTERVENTIONS:  - Assess patient frequently for physical needs  -  Identify cognitive and physical deficits and behaviors that affect risk of falls    -  [place] fall precautions as indicated by assessment   - Educate patient/family on patient safety including physical limitations  - Instruct patient to call for assistance with activity based on assessment  - Modify environment to reduce risk of injury  - Consider OT/PT consult to assist with strengthening/mobility  Outcome: Progressing  Goal: Maintain or return to baseline ADL function  Description: INTERVENTIONS:  -  Assess patient's ability to carry out ADLs; assess patient's baseline for ADL function and identify physical deficits which impact ability to perform ADLs (bathing, care of mouth/teeth, toileting, grooming, dressing, etc )  - Assess/evaluate cause of self-care deficits   - Assess range of motion  - Assess patient's mobility; develop plan if impaired  - Assess patient's need for assistive devices and provide as appropriate  - Encourage maximum independence but intervene and supervise when necessary  - Involve family in performance of ADLs  - Assess for home care needs following discharge   - Consider OT consult to assist with ADL evaluation and planning for discharge  - Provide patient education as appropriate  Outcome: Progressing  Goal: Maintain or return mobility status to optimal level  Description: INTERVENTIONS:  - Assess patient's baseline mobility status (ambulation, transfers, stairs, etc )    - Identify cognitive and physical deficits and behaviors that affect mobility  - Identify mobility aids required to assist with transfers and/or ambulation (gait belt, sit-to-stand, lift, walker, cane, etc )  - [place] fall precautions as indicated by assessment  - Record patient progress and toleration of activity level on Mobility SBAR; progress patient to next Phase/Stage  - Instruct patient to call for assistance with activity based on assessment  - Consider rehabilitation consult to assist with strengthening/weightbearing, etc   Outcome: Progressing     Problem: DISCHARGE PLANNING  Goal: Discharge to home or other facility with appropriate resources  Description: INTERVENTIONS:  - Identify barriers to discharge w/patient and caregiver  - Arrange for needed discharge resources and transportation as appropriate  - Identify discharge learning needs (meds, wound care, etc )  - Arrange for interpretive services to assist at discharge as needed  - Refer to Case Management Department for coordinating discharge planning if the patient needs post-hospital services based on physician/advanced practitioner order or complex needs related to functional status, cognitive ability, or social support system  Outcome: Progressing

## 2021-03-25 NOTE — QUICK NOTE
Post Op Check:    Saw patient at the bedside once they arrived to the floor  Patients pain is well controlled  Patient was admitted for overnight admission secondary to wound care and pain control  GEN: NAD, A&O3  Chest: Normal work of breathing, no respiratory distress  Abd: S, ND, NT  Extremity: No CCE  Skin: Dressing in place with drainage      Plan:  Diet Regular; Regular House   Transition to home vac tomorrow  Continue to monitor  Pain and nausea control PRN  DC home tomorrow    Kylie Kemp,   Surgery, PGY-1

## 2021-03-25 NOTE — PROGRESS NOTES
Progress Note - General Surgery   Chloe Pierre 35 y o  male MRN: [id number]  Unit/Bed#: E2 -01 Encounter: [id number]    Assessment:  34 yo male s/p pilonidal cyst excision on 3/24    AVSS    Plan:  Transition to home vac therapy today  Follow up CM for [facility] for vac changes  Discharge home today    Subjective/Objective   Chief Complaint:     Subjective: No acute events overnight  No pain, tolerated diet  No nausea or vomiting  Pain well controlled  Objective:     Blood pressure 134/79, pulse 82, temperature (!) 97 2 °F (36 2 °C), resp  rate 18, height 6' (1 829 m), weight 111 kg (244 lb), SpO2 95 %  ,Body mass index is 33 09 kg/m²  Intake/Output Summary (Last 24 hours) at 3/24/2021 2212  Last data filed at 3/24/2021 1930  Gross per 24 hour   Intake 1300 ml   Output --   Net 1300 ml       Invasive Devices     Peripheral Intravenous Line            Peripheral IV 03/24/21 Left Hand less than 1 day                Physical Exam:   General: WDWN NAD  HENT: NCAT MMM  Neck: supple, no JVD  CV: RRR  Lungs: CTAB  ABD: Soft, nontender, nondistended  Extrem: No CCE  Neuro: AAOx3      Lab, Imaging and other studies:I have personally reviewed pertinent lab results      VTE Pharmacologic Prophylaxis: Sequential compression device (Venodyne)   VTE Mechanical Prophylaxis: sequential compression device

## 2021-03-25 NOTE — DISCHARGE INSTRUCTIONS
Wound care instructions  One black sponge in the wound and one black sponge as bridge to lateral thigh  Please change every other day  Wound vac to 125mmhg continuous  [facility] Instructions  Dr Nevaeh Bee MD, FACS    1  General: You will feel pulling sensations around the wound or funny aches and pains around the incisions  This is normal  Even minor surgery is a change in your body and this is your bodys way of reaction to it  If you have had abdominal surgery, it may help to support the incision with a small pillow or blanket for comfort when moving or coughing  2  Wound care: Make sure to remove the bandage in about 24 hours, unless instructed otherwise  You usually don't have to redress the wound after 24-48 hours, unless for comfort  Keep the incision clean and dry  Let air get to it  If this Steri-Strips fall off, just keep the wound clean  3  Water: You may shower over the wound, unless there are drain tubes left in place  Do not bathe or use a pool or hot tub until cleared by the physician  You may shower right over the staples or Steri-Strips and packing dry when you are done  4  Activity: You may go up and down stairs, walk as much as you are comfortable, but walk at least 3 times each day  If you have had abdominal surgery, do not lift anything heavier than 15 pounds for at least 2-4 weeks, unless cleared by the doctor  5  Diet: You may resume a regular diet  If you had a same-day surgery or overnight stay surgery, you may wish to eat lightly for a few days: soups, crackers, and sandwiches  You may resume a regular diet when ready  6  Medications: Resume all of your previous medications, unless told otherwise by the doctor  Avoid aspirin or ibuprofen (Advil, Motrin, etc ) products for 2-3 days after the date of surgery  You may, at that time, began to take them again   Tylenol is always fine, unless you are taking any narcotic pain medication containing Tylenol (such as Percocet, Darvocet, Vicodin, or anything containing acetaminophen)  Do not take Tylenol if you're taking these medications  You do not need to take the narcotic pain medications unless you are having significant pain and discomfort  7  Driving: You will need someone to drive you home on the day of surgery  Do not drive or make any important decisions while on narcotic pain medication or 24 hours and after anesthesia or sedation for surgery  Generally, you may drive when your off all narcotic pain medications  8  Upset Stomach: You may take Maalox, Tums, or similar items for an upset stomach  If your narcotic pain medication causes an upset stomach, do not take it on an empty stomach  Try taking it with at least some crackers or toast      9  Constipation: Patients often experienced constipation after surgery  You may take over-the-counter medication for this, such as Metamucil, Senokot, Dulcolax, milk of magnesia, etc  You may take a suppository unless you have had anorectal surgery such as a procedure on your hemorrhoids  If you experience significant nausea or vomiting after abdominal surgery, call the office before trying any of these medications  10  Call the office: If you are experiencing any of the following, fevers above 101 5°, significant nausea or vomiting, if the wound develops drainage and/or is excessive redness around the wound, or if you have significant diarrhea or other worsening symptoms  11  Pain: You may be given a prescription for pain  This will be given to the hospital, the day of surgery  12  Sexual Activity: You may resume sexual activity when you feel ready and comfortable and your incision is sealed and healed without apparent infection risk  13  Urination: If you haven't urinated in 6 hours, go directly to the ER for evaluation for urinary retention       [address]  [place], [place] [id number]  Phone: [phone]

## 2021-03-25 NOTE — PROGRESS NOTES
Bedside procedure  Wound care  Surgery    Wound vac applied bedside  OR dressings taken down  1 piece of kerlix removed from wound  Wound was clean and measured [id number] in size  One black sponge placed in wound, and one black bridge to R lateral thigh applied  Wound vac tape applied and vac set to 125 mm Hg continuous suction  Vac had good seal and there was no leak  Pt tolerated procedure well without complications       Brittaney Givens MD  3/25/21  8:20AM

## 2021-03-25 NOTE — DISCHARGE SUMMARY
Discharge Summary - Jennifer Lira 35 y o  male MRN: [id number]    Unit/Bed#: E2 -01 Encounter: [id number]    Admission Date:     Admitting Diagnosis: Pilonidal cyst [L05 91]    HPI: Pt is a 36 y/o M w pilonidal cyst  PMH of schizophrenia  Otherwise healthy  Admitted on 3/24 s/p pilonidal cyst resection, for wound care, wound vac placement  Procedures Performed: No orders of the defined types were placed in this encounter  Summary of Hospital Course: Pt admitted on 3/24  Take to OR for pilonidal cyst removal  Procedure performed w/o issues  Taken to pacu in stable condition, then transferred to [facility] where he recovered w/o issues  Tolerated diet, pain well controlled, voiding adequate urine  On POD1 wound vac application applied and pt was discharged with home VNA for wound care  Significant Findings, Care, Treatment and Services Provided: none    Complications: none    Discharge Diagnosis: same    Resolved Problems  Date Reviewed: 3/24/2021          Resolved    * (Principal) Pilonidal cyst with abscess 3/25/2021     Resolved by  Bunny Harris MD          Condition at Discharge: good         Discharge instructions/Information to patient and family:   See after visit summary for information provided to patient and family  Provisions for Follow-Up Care:  See after visit summary for information related to follow-up care and any pertinent home health orders  PCP: Billy Valenzuela MD    Disposition: See After Visit Summary for discharge disposition information  Planned Readmission: No      Discharge Statement   I spent 30 minutes discharging the patient  This time was spent on the day of discharge  I had direct contact with the patient on the day of discharge  Additional documentation is required if more than 30 minutes were spent on discharge  Discharge Medications:  See after visit summary for reconciled discharge medications provided to patient and family

## 2021-03-25 NOTE — TELEPHONE ENCOUNTER
Patient called wanting to know if the amount of drainage ok  Bulb 1/2 full and color pink  Instructed patient to drain and squeeze air to help with suction  He has no other complaints and visiting nurse coming out tomorrow  Reason for Disposition   [1] Clear or blood-tinged fluid draining from wound AND [2] no fever    Answer Assessment - Initial Assessment Questions  1  SYMPTOM: "What's the main symptom you're concerned about?" (e g , redness, pain, drainage)      Drainage amount  2  ONSET: "When did 3 25  start?"      today  3  SURGERY: "What surgery was performed?"      *Pilonidial cyst  4  DATE of SURGERY: "When was surgery performed?"       3/25  5  INCISION SITE: "Where is the incision located?"      Back buttock area  6  REDNESS: "Is there any redness at the incision site?" If yes, ask: "How wide across is the redness?" (Inches, centimeters)       no  7  PAIN: "Is there any pain?" If so, ask: "How bad is it?"  (Scale 1-10; or mild, moderate, severe)      mild  8  BLEEDING: "Is there any bleeding?" If so, ask: "How much?" and "Where?"      no  9  DRAINAGE: "Is there any drainage from the incision site?" If yes, ask: "What color and how much?" (e g , red, cloudy, pus; drops, teaspoon)     Pink and bulb 1/2 full  10  FEVER: "Do you have a fever?" If so, ask: "What is your temperature, how was it measured, and when did it start?"       no  11   OTHER SYMPTOMS: "Do you have any other symptoms?" (e g , shaking chills, weakness, rash elsewhere on body)       no    Protocols used: POST-OP INCISION SYMPTOMS AND QUESTIONS-ADULT-AH

## 2021-03-25 NOTE — CASE MANAGEMENT
CM notified by surgery that pt is to be discharged today with a wound vac  Wound vac was placed  Per surgery it has been approved and this is pt's wound vac to take home  Pt has been accepted by [facility] to manage wound care  No other needs expressed or identified  Please contact CM with any questions or concerns  A post acute care recommendation was made by your care team for [facility]  Discussed Freedom of Choice with patient  List of agencies given to patient via in person  patient aware the list is custom filtered for them by preference  and that [facility] post acute providers are designated

## 2021-03-25 NOTE — TELEPHONE ENCOUNTER
Regarding: I had surgery today and i have alot of drainage in the vacuum   ----- Message from Alannah Dangelo sent at 3/25/2021  5:46 PM EDT -----  I had a pilonidial cyst removed today and I do have a drain in and I have a lot of drainage , not sure if this is normal want to make sure

## 2021-03-26 ENCOUNTER — TRANSITIONAL CARE MANAGEMENT (OUTPATIENT)
Dept: FAMILY MEDICINE CLINIC | Facility: CLINIC | Age: 31
End: 2021-03-26

## 2021-03-26 LAB
BACTERIA SPEC ANAEROBE CULT: NORMAL
BACTERIA TISS AEROBE CULT: ABNORMAL
GRAM STN SPEC: ABNORMAL
GRAM STN SPEC: ABNORMAL

## 2021-03-26 NOTE — TELEPHONE ENCOUNTER
Called and spoke with patient  He denies any F/V/N/C and he has not had a bowel movement yet  Patient is aware to use VNA's showering instructions since he has the wound vac on  Patient is aware to call the office with any questions or concerns  He is aware he will be receiving a call once his pathology has been finalized and verified  Path pending

## 2021-03-27 ENCOUNTER — TELEPHONE (OUTPATIENT)
Dept: OTHER | Facility: OTHER | Age: 31
End: 2021-03-27

## 2021-04-01 NOTE — TELEPHONE ENCOUNTER
Pathology has been finalized and verified by provider  Called and spoke to patient  Results were given and patient verbalized understanding  Patient states that he is doing well  Confirmed POPV appointment  Patient will call office, if needed

## 2021-04-08 ENCOUNTER — TELEPHONE (OUTPATIENT)
Dept: FAMILY MEDICINE CLINIC | Facility: CLINIC | Age: 31
End: 2021-04-08

## 2021-04-08 ENCOUNTER — HOSPITAL ENCOUNTER (EMERGENCY)
Facility: HOSPITAL | Age: 31
Discharge: HOME | End: 2021-04-08
Attending: EMERGENCY MEDICINE
Payer: MEDICARE

## 2021-04-08 ENCOUNTER — APPOINTMENT (EMERGENCY)
Dept: CT IMAGING | Facility: HOSPITAL | Age: 31
End: 2021-04-08
Payer: MEDICARE

## 2021-04-08 VITALS
OXYGEN SATURATION: 97 % | SYSTOLIC BLOOD PRESSURE: 117 MMHG | RESPIRATION RATE: 18 BRPM | HEART RATE: 92 BPM | TEMPERATURE: 97.9 F | DIASTOLIC BLOOD PRESSURE: 71 MMHG

## 2021-04-08 DIAGNOSIS — R00.0 TACHYCARDIA: Primary | ICD-10-CM

## 2021-04-08 LAB
ANION GAP SERPL CALCULATED.3IONS-SCNC: 6 MMOL/L (ref 4–13)
ATRIAL RATE: 93 BPM
BASOPHILS # BLD AUTO: 0.06 THOUSANDS/ΜL (ref 0–0.1)
BASOPHILS NFR BLD AUTO: 1 % (ref 0–1)
BUN SERPL-MCNC: 12 MG/DL (ref 5–25)
CALCIUM SERPL-MCNC: 8.7 MG/DL (ref 8.3–10.1)
CHLORIDE SERPL-SCNC: 104 MMOL/L (ref 100–108)
CO2 SERPL-SCNC: 30 MMOL/L (ref 21–32)
CREAT SERPL-MCNC: 1.07 MG/DL (ref 0.6–1.3)
D DIMER PPP FEU-MCNC: 0.58 UG/ML FEU
EOSINOPHIL # BLD AUTO: 0.17 THOUSAND/ΜL (ref 0–0.61)
EOSINOPHIL NFR BLD AUTO: 2 % (ref 0–6)
ERYTHROCYTE [DISTWIDTH] IN BLOOD BY AUTOMATED COUNT: 13.9 % (ref 11.6–15.1)
GFR SERPL CREATININE-BSD FRML MDRD: 93 ML/MIN/1.73SQ M
GLUCOSE SERPL-MCNC: 96 MG/DL (ref 65–140)
HCT VFR BLD AUTO: 39.6 % (ref 36.5–49.3)
HGB BLD-MCNC: 12.9 G/DL (ref 12–17)
IMM GRANULOCYTES # BLD AUTO: 0.06 THOUSAND/UL (ref 0–0.2)
IMM GRANULOCYTES NFR BLD AUTO: 1 % (ref 0–2)
LYMPHOCYTES # BLD AUTO: 1.55 THOUSANDS/ΜL (ref 0.6–4.47)
LYMPHOCYTES NFR BLD AUTO: 15 % (ref 14–44)
MCH RBC QN AUTO: 29.7 PG (ref 26.8–34.3)
MCHC RBC AUTO-ENTMCNC: 32.6 G/DL (ref 31.4–37.4)
MCV RBC AUTO: 91 FL (ref 82–98)
MONOCYTES # BLD AUTO: 0.69 THOUSAND/ΜL (ref 0.17–1.22)
MONOCYTES NFR BLD AUTO: 7 % (ref 4–12)
NEUTROPHILS # BLD AUTO: 7.68 THOUSANDS/ΜL (ref 1.85–7.62)
NEUTS SEG NFR BLD AUTO: 74 % (ref 43–75)
NRBC BLD AUTO-RTO: 0 /100 WBCS
P AXIS: 52 DEGREES
PLATELET # BLD AUTO: 248 THOUSANDS/UL (ref 149–390)
PMV BLD AUTO: 10.3 FL (ref 8.9–12.7)
POTASSIUM SERPL-SCNC: 4.5 MMOL/L (ref 3.5–5.3)
PR INTERVAL: 130 MS
QRS AXIS: 64 DEGREES
QRSD INTERVAL: 78 MS
QT INTERVAL: 360 MS
QTC INTERVAL: 447 MS
RBC # BLD AUTO: 4.35 MILLION/UL (ref 3.88–5.62)
SODIUM SERPL-SCNC: 140 MMOL/L (ref 136–145)
T WAVE AXIS: 35 DEGREES
TSH SERPL DL<=0.05 MIU/L-ACNC: 0.63 UIU/ML (ref 0.36–3.74)
VENTRICULAR RATE: 93 BPM
WBC # BLD AUTO: 10.21 THOUSAND/UL (ref 4.31–10.16)

## 2021-04-08 PROCEDURE — 71275 CT ANGIOGRAPHY CHEST: CPT

## 2021-04-08 PROCEDURE — 36415 COLL VENOUS BLD VENIPUNCTURE: CPT | Performed by: EMERGENCY MEDICINE

## 2021-04-08 PROCEDURE — 99285 EMERGENCY DEPT VISIT HI MDM: CPT | Performed by: EMERGENCY MEDICINE

## 2021-04-08 PROCEDURE — 93010 ELECTROCARDIOGRAM REPORT: CPT | Performed by: INTERNAL MEDICINE

## 2021-04-08 PROCEDURE — 85379 FIBRIN DEGRADATION QUANT: CPT | Performed by: EMERGENCY MEDICINE

## 2021-04-08 PROCEDURE — 93005 ELECTROCARDIOGRAM TRACING: CPT

## 2021-04-08 PROCEDURE — 80048 BASIC METABOLIC PNL TOTAL CA: CPT | Performed by: EMERGENCY MEDICINE

## 2021-04-08 PROCEDURE — 96361 HYDRATE IV INFUSION ADD-ON: CPT

## 2021-04-08 PROCEDURE — 99284 EMERGENCY DEPT VISIT MOD MDM: CPT

## 2021-04-08 PROCEDURE — 84443 ASSAY THYROID STIM HORMONE: CPT | Performed by: EMERGENCY MEDICINE

## 2021-04-08 PROCEDURE — 85025 COMPLETE CBC W/AUTO DIFF WBC: CPT | Performed by: EMERGENCY MEDICINE

## 2021-04-08 PROCEDURE — 96360 HYDRATION IV INFUSION INIT: CPT

## 2021-04-08 RX ADMIN — IOHEXOL 85 ML: 350 INJECTION, SOLUTION INTRAVENOUS at 17:19

## 2021-04-08 RX ADMIN — SODIUM CHLORIDE 1000 ML: 0.9 INJECTION, SOLUTION INTRAVENOUS at 13:21
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
## 2021-04-08 NOTE — TELEPHONE ENCOUNTER
Keagan Lynne from [address] [place] [address] Sheba SMITH is visiting with patient, states he has high heart rate of 134 at rest and low BP of 93/55  He has been more fatigued, she states it took him a long time to answer phone  She is treating him for wound vac care every other day  We discussed need for ED evaluation with patient, he was agreeable

## 2021-04-09 ENCOUNTER — OFFICE VISIT (OUTPATIENT)
Dept: SURGERY | Facility: CLINIC | Age: 31
End: 2021-04-09

## 2021-04-09 VITALS
HEIGHT: 71 IN | DIASTOLIC BLOOD PRESSURE: 72 MMHG | SYSTOLIC BLOOD PRESSURE: 120 MMHG | WEIGHT: 242 LBS | TEMPERATURE: 97.4 F | BODY MASS INDEX: 33.88 KG/M2 | HEART RATE: 119 BPM

## 2021-04-09 DIAGNOSIS — L05.01 PILONIDAL CYST WITH ABSCESS: Primary | ICD-10-CM

## 2021-04-09 PROCEDURE — 99024 POSTOP FOLLOW-UP VISIT: CPT | Performed by: PHYSICIAN ASSISTANT
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
## 2021-04-21 ENCOUNTER — TELEPHONE (OUTPATIENT)
Dept: OTHER | Facility: OTHER | Age: 31
End: 2021-04-21

## 2021-04-21 NOTE — TELEPHONE ENCOUNTER
Leon Eagle called from [facility] Requesting Operative Report from 3- Also the Complete Wound Information from Section 5 C on the [employer] Form  Call Back Number is [phone] EXT [phone]  Angier Organ  FAX Number [phone]

## 2021-04-26 NOTE — TELEPHONE ENCOUNTER
Received call from [facility]  Answered some questions verbally (wound dimensions) and faxed over OP note along with POPV note

## 2021-04-30 ENCOUNTER — TELEPHONE (OUTPATIENT)
Dept: FAMILY MEDICINE CLINIC | Facility: CLINIC | Age: 31
End: 2021-04-30

## 2021-04-30 NOTE — TELEPHONE ENCOUNTER
Carolyn Gomez from [employer] called after visit with patient today  She states his heart rate over past few weeks has been 105-115 with BP of 110/70  She states that he has been regularly tachycardic  Today's heart rate is 120 at rest, BP is 106/64  Patient is staying hydrated, is alert and oriented, pt states he feels good, denies any pain or anxiety  Patient is afebrile  Caller's concern is that he is tachycardic  He has follow up with surgery for excision of pilonidal cyst on Monday  Please advise if there is anything we need to do  Thank you

## 2021-04-30 NOTE — TELEPHONE ENCOUNTER
I have not seen this patient in 3 or 4 years  He was switched over to Dr Berta Bailey as his primary care provider for some reason  He should follow-up with Dr Berta Bailey next week  If symptoms become worse or if he becomes short of breath he should be seen in the emergency department

## 2021-05-03 ENCOUNTER — OFFICE VISIT (OUTPATIENT)
Dept: SURGERY | Facility: CLINIC | Age: 31
End: 2021-05-03

## 2021-05-03 VITALS
HEART RATE: 116 BPM | DIASTOLIC BLOOD PRESSURE: 70 MMHG | HEIGHT: 71 IN | BODY MASS INDEX: 34.3 KG/M2 | TEMPERATURE: 97.7 F | WEIGHT: 245 LBS | SYSTOLIC BLOOD PRESSURE: 120 MMHG

## 2021-05-03 DIAGNOSIS — L05.91 PILONIDAL CYST: Primary | ICD-10-CM

## 2021-05-03 PROCEDURE — 99024 POSTOP FOLLOW-UP VISIT: CPT | Performed by: PHYSICIAN ASSISTANT

## 2021-05-03 RX ORDER — CLOZAPINE 100 MG/1
200 TABLET ORAL
COMMUNITY
Start: 2021-04-13
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
## 2021-06-04 ENCOUNTER — OFFICE VISIT (OUTPATIENT)
Dept: SURGERY | Facility: CLINIC | Age: 31
End: 2021-06-04

## 2021-06-04 VITALS
HEIGHT: 71 IN | BODY MASS INDEX: 33.32 KG/M2 | HEART RATE: 108 BPM | WEIGHT: 238 LBS | DIASTOLIC BLOOD PRESSURE: 70 MMHG | TEMPERATURE: 97.8 F | SYSTOLIC BLOOD PRESSURE: 122 MMHG

## 2021-06-04 DIAGNOSIS — L05.01 PILONIDAL CYST WITH ABSCESS: Primary | ICD-10-CM

## 2021-06-04 PROCEDURE — 99024 POSTOP FOLLOW-UP VISIT: CPT | Performed by: PHYSICIAN ASSISTANT
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
## 2021-06-29 ENCOUNTER — OFFICE VISIT (OUTPATIENT)
Dept: SURGERY | Facility: CLINIC | Age: 31
End: 2021-06-29

## 2021-06-29 VITALS
HEIGHT: 71 IN | BODY MASS INDEX: 33.46 KG/M2 | DIASTOLIC BLOOD PRESSURE: 80 MMHG | WEIGHT: 239 LBS | HEART RATE: 123 BPM | SYSTOLIC BLOOD PRESSURE: 120 MMHG | TEMPERATURE: 97.3 F

## 2021-06-29 DIAGNOSIS — L05.01 PILONIDAL CYST WITH ABSCESS: Primary | ICD-10-CM

## 2021-06-29 PROCEDURE — 99024 POSTOP FOLLOW-UP VISIT: CPT | Performed by: PHYSICIAN ASSISTANT
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
## 2021-08-11 ENCOUNTER — OFFICE VISIT (OUTPATIENT)
Dept: SURGERY | Facility: CLINIC | Age: 31
End: 2021-08-11
Payer: MEDICARE

## 2021-08-11 VITALS
HEIGHT: 71 IN | WEIGHT: 239 LBS | DIASTOLIC BLOOD PRESSURE: 88 MMHG | HEART RATE: 132 BPM | BODY MASS INDEX: 33.46 KG/M2 | SYSTOLIC BLOOD PRESSURE: 132 MMHG

## 2021-08-11 DIAGNOSIS — L05.91 PILONIDAL CYST: ICD-10-CM

## 2021-08-11 DIAGNOSIS — K92.1 BLOOD IN STOOL: Primary | ICD-10-CM

## 2021-08-11 PROCEDURE — 99213 OFFICE O/P EST LOW 20 MIN: CPT | Performed by: PHYSICIAN ASSISTANT
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
## 2021-08-23 ENCOUNTER — TELEPHONE (OUTPATIENT)
Dept: GASTROENTEROLOGY | Facility: HOSPITAL | Age: 31
End: 2021-08-23

## 2021-08-24 ENCOUNTER — ANESTHESIA (OUTPATIENT)
Dept: GASTROENTEROLOGY | Facility: HOSPITAL | Age: 31
End: 2021-08-24

## 2021-08-24 ENCOUNTER — HOSPITAL ENCOUNTER (OUTPATIENT)
Dept: GASTROENTEROLOGY | Facility: HOSPITAL | Age: 31
Setting detail: OUTPATIENT SURGERY
Discharge: HOME | End: 2021-08-24
Attending: SURGERY
Payer: MEDICARE

## 2021-08-24 ENCOUNTER — ANESTHESIA EVENT (OUTPATIENT)
Dept: GASTROENTEROLOGY | Facility: HOSPITAL | Age: 31
End: 2021-08-24

## 2021-08-24 VITALS
SYSTOLIC BLOOD PRESSURE: 101 MMHG | TEMPERATURE: 97.9 F | WEIGHT: 239 LBS | HEIGHT: 71 IN | RESPIRATION RATE: 16 BRPM | HEART RATE: 84 BPM | DIASTOLIC BLOOD PRESSURE: 66 MMHG | BODY MASS INDEX: 33.46 KG/M2 | OXYGEN SATURATION: 94 %

## 2021-08-24 DIAGNOSIS — K92.1 BLOOD IN STOOL: ICD-10-CM

## 2021-08-24 PROCEDURE — 45378 DIAGNOSTIC COLONOSCOPY: CPT | Performed by: SURGERY

## 2021-08-24 RX ORDER — PROPOFOL 10 MG/ML
INJECTION, EMULSION INTRAVENOUS AS NEEDED
Status: DISCONTINUED | OUTPATIENT
Start: 2021-08-24 | End: 2021-08-24

## 2021-08-24 RX ORDER — SODIUM CHLORIDE 9 MG/ML
125 INJECTION, SOLUTION INTRAVENOUS CONTINUOUS
Status: DISCONTINUED | OUTPATIENT
Start: 2021-08-24 | End: 2021-08-28 | Stop reason: HOSPADM

## 2021-08-24 RX ADMIN — SODIUM CHLORIDE 125 ML/HR: 0.9 INJECTION, SOLUTION INTRAVENOUS at 11:09

## 2021-08-24 RX ADMIN — PROPOFOL 100 MG: 10 INJECTION, EMULSION INTRAVENOUS at 11:34

## 2021-08-24 RX ADMIN — PROPOFOL 150 MG: 10 INJECTION, EMULSION INTRAVENOUS at 11:30

## 2021-08-24 NOTE — ANESTHESIA POSTPROCEDURE EVALUATION
Post-Op Assessment Note    CV Status:  Stable    Pain management: adequate     Mental Status:  Alert and awake   Hydration Status:  Euvolemic   PONV Controlled:  Controlled   Airway Patency:  Patent      Post Op Vitals Reviewed: Yes      Staff: Anesthesiologist         No complications documented      BP 93/57 (08/24/21 1148)    Temp      Pulse 86 (08/24/21 1148)   Resp 16 (08/24/21 1148)    SpO2 96 % (08/24/21 1148)

## 2021-08-24 NOTE — DISCHARGE INSTRUCTIONS
[facility]oul [facility]  Endoscopy Post-Operative Instructions  Dr Elsy Dumont MD, FACS    Procedure: Colonoscopy    Findings:  Diverticulosis    Follow-Up: You will need a repeat Endoscopy in (generally)1 year due to poor preparation  Next time consider 3 day prep  Will await final pathology report for final determination of number of years until your follow up endoscopy, if you had polyps on this exam   Different types of polyps require different lengths of follow up surveillance  Please call our office or your primary doctor's office if you have any questions, once the report is returned  You should have an endoscopy sooner than recommended if you have any symptoms of bleeding or change in stools or other concerns  You will receive a call from our office with your results, in addition to the the preliminary results you received today  You will usually receive a follow-up letter from our office in 1-2 weeks  Call the office if you do not hear from us  You are welcome to also schedule an office visit if desired to discuss the results further  It is your responsibility to contact our office for results in 1- 2 weeks if you do not hear from us  If a follow up endoscopy is needed, you are responsible for arranging that follow up appointment at the appropriate time  The office may or may not issue a reminder at that future time  Please take responsibility for your own follow up healthcare  Diet: Eat a light snack first, and then resume your previous diet  Call the office if you have unusual fevers, chills, nausea or vomiting or abdominal pain  Report to the emergency room with these are severe in nature  Activity: Do not drive a car, operate machinery, or sign legal documents for 24 hours after your procedure  Normal activity may be resumed on the day following the procedure       Call the office at [phone] for any of the following: Severe abdominal pain, significant rectal bleeding, chills, or fever above 100°, new onset of persistent cough or persistent vomiting  Lea Ramirez [address] [facility]  Phone: [phone]                Colonoscopy   WHAT YOU NEED TO KNOW:   A colonoscopy is a procedure to examine the inside of your colon (intestine) with a scope  Polyps or tissue growths may have been removed during your colonoscopy  It is normal to feel bloated and to have some abdominal discomfort  You should be passing gas  If you have hemorrhoids or you had polyps removed, you may have a small amount of bleeding  DISCHARGE INSTRUCTIONS:   Call your doctor if:   · You have a large amount of bright red blood in your bowel movements  · Your abdomen is hard and firm and you have severe pain  · You have sudden trouble breathing  · You develop a rash or hives  · You have a fever within 24 hours of your procedure  · You have not had a bowel movement for 3 days after your procedure  · You have questions or concerns about your condition or care  After your colonoscopy:   · Do not lift, strain, or run  for 3 days  · Rest as much as possible  You have been given medicine to relax you  Do not  drive or make important decisions for at least 24 hours  Return to your normal activity as directed  · Relieve gas and discomfort from bloating  by lying on your left side with a heating pad on your abdomen  You may need to take short walks to help the gas move out  Eat small meals until bloating is relieved  If you had polyps removed: For 7 days after your procedure:  · Do not  take aspirin  · Do not  go on long car rides  Help prevent constipation:   · Eat a variety of healthy foods  Healthy foods include fruit, vegetables, whole-grain breads, low-fat dairy products, beans, lean meat, and fish  Ask if you need to be on a special diet   Your healthcare provider may recommend that you eat high-fiber foods such as cooked beans  Fiber helps you have regular bowel movements  · Drink liquids as directed  Adults should drink between 9 and 13 eight-ounce cups of liquid every day  Ask what amount is best for you  For most people, good liquids to drink are water, juice, and milk  · Exercise as directed  Talk to your healthcare provider about the best exercise plan for you  Exercise can help prevent constipation, decrease your blood pressure and improve your health  Follow up with your healthcare provider as directed:  Write down your questions so you remember to ask them during your visits  © Copyright [employer] 2021 Information is for End User's use only and may not be sold, redistributed or otherwise used for commercial purposes  All illustrations and images included in CareNotes® are the copyrighted property of A D A M , Inc  or [facility] [place]   The above information is an  only  It is not intended as medical advice for individual conditions or treatments  Talk to your doctor, nurse or pharmacist before following any medical regimen to see if it is safe and effective for you  Diverticulosis   WHAT YOU NEED TO KNOW:   What is diverticulosis? Diverticulosis is a condition that causes small pockets called diverticula to form in your intestine  These pockets make it difficult for bowel movements to pass through your digestive system  What causes diverticulosis? Diverticula form when muscles have to work hard to move bowel movements through the intestine  The force causes bulges to form at weak areas in the intestine  This may happen if you eat foods that are low in fiber  Fiber helps give your bowel movements more bulk so they are larger and easier to move through your colon   The following may increase your risk of diverticulosis:  · A history of constipation    · Age 36 or older    · Obesity    · Lack of exercise    What are the signs and symptoms of diverticulosis? Diverticulosis usually does not cause any signs or symptoms  It may cause any of the following in some people:  · Pain or discomfort in your lower abdomen    · Abdominal bloating    · Constipation or diarrhea    How is diverticulosis diagnosed? Your healthcare provider will examine you and ask about your bowel movements, diet, and symptoms  He or she will also ask about any medical conditions you have or medicines you take  You may need any of the following:  · Blood tests  may be done to check for signs of inflammation  · A barium enema  is an x-ray of your colon that may show diverticula  A tube is put into your anus, and a liquid called barium is put through the tube  Barium is used so that healthcare providers can see your colon more clearly  · Flexible sigmoidoscopy  is a test to look for any changes in your lower intestines and rectum  It may also show the cause of any bleeding or pain  A soft, bendable tube with a light on the end will be put into your anus  It will then be moved forward into your intestine  · A colonoscopy  is used to look at your whole colon  A scope (long bendable tube with a light on the end) is used to take pictures  This test may show diverticula  · A CT scan , or CAT scan, may show diverticula  You may be given contrast liquid before the scan  Tell the healthcare provider if you have ever had an allergic reaction to contrast liquid  How is diverticulosis managed? The goal of treatment is to manage any symptoms you have and prevent other problems such as diverticulitis  Diverticulitis is swelling or infection of the diverticula  Your healthcare provider may recommend any of the following:  · Eat a variety of high-fiber foods  High-fiber foods help you have regular bowel movements  High-fiber foods include cooked beans, fruits, vegetables, and some cereals  Most adults need 25 to 35 grams of fiber each day   Your healthcare provider may recommend that you have more  Ask your healthcare provider how much fiber you need  Increase fiber slowly  You may have abdominal discomfort, bloating, and gas if you add fiber to your diet too quickly  You may need to take a fiber supplement if you are not getting enough fiber from food  · Medicines  to soften your bowel movements may be given  You may also need medicines to treat symptoms such as bloating and pain  · Drink liquids as directed  You may need to drink 2 to 3 liters (8 to 12 cups) of liquids every day  Ask your healthcare provider how much liquid to drink each day and which liquids are best for you  · Apply heat  on your abdomen for 20 to 30 minutes every 2 hours for as many days as directed  Heat helps decrease pain and muscle spasms  How can I help prevent diverticulitis or other symptoms? The following may help decrease your risk for diverticulitis or symptoms, such as bleeding  Talk to your provider about these or other things you can do to prevent problems that may occur with diverticulosis  · Exercise regularly  Ask your healthcare provider about the best exercise plan for you  Exercise can help you have regular bowel movements  Get 30 minutes of exercise on most days of the week  · Maintain a healthy weight  Ask your healthcare provider how much you should weigh  Ask him or her to help you create a weight loss plan if you are overweight  · Do not smoke  Nicotine and other chemicals in cigarettes increase your risk for diverticulitis  Ask your healthcare provider for information if you currently smoke and need help to quit  E-cigarettes or smokeless tobacco still contain nicotine  Talk to your healthcare provider before you use these products  · Ask your healthcare provider if it is safe to take NSAIDs  NSAIDs may increase your risk of diverticulitis  When should I seek immediate care? · You have severe pain on the left side of your lower abdomen      · Your bowel movements are bright or dark red  When should I contact my healthcare provider? · You have a fever and chills  · You feel dizzy or lightheaded  · You have nausea, or you are vomiting  · You have a change in your bowel movements  · You have questions or concerns about your condition or care  CARE AGREEMENT:   You have the right to help plan your care  Learn about your health condition and how it may be treated  Discuss treatment options with your healthcare providers to decide what care you want to receive  You always have the right to refuse treatment  The above information is an  only  It is not intended as medical advice for individual conditions or treatments  Talk to your doctor, nurse or pharmacist before following any medical regimen to see if it is safe and effective for you  © Copyright [employer] 2021 Information is for End User's use only and may not be sold, redistributed or otherwise used for commercial purposes  All illustrations and images included in CareNotes® are the copyrighted property of A D A M , Inc  or [address] [place] [address]      Diverticulosis Diet   WHAT YOU NEED TO KNOW:   What is a diverticulosis diet? A diverticulosis diet includes high-fiber foods  High-fiber foods help you have regular bowel movements  Extra fiber may decrease your risk of forming new diverticula (small pockets) in your intestine  A high-fiber diet may also help prevent diverticulitis  Diverticulitis is a painful condition that occurs when diverticula become inflamed or infected  You do not need to avoid nuts, seeds, corn, or popcorn while you are on a diverticulosis diet  How much fiber do I need? You may need 25 to 35 grams of fiber each day  Ask your dietitian or healthcare provider how much fiber you should have  Increase your intake of fiber slowly  When you eat more fiber, you may have gas and feel bloated   You may need to take a fiber supplement if you do not get enough fiber from food  Drink plenty of liquids as you increase the fiber in your diet  Your dietitian or healthcare provider may recommend 8 eight-ounce cups or more each day  Ask which liquids are best for you  Which foods are high in fiber? · Foods with at least 4 grams of fiber per serving:      ? ? to ½ cup of high-fiber cereal (check the nutrition label on the box)    ? ½ cup of blackberries or raspberries    ? 4 dried prunes    ? 1 cooked artichoke    ? ½ cup of cooked legumes, such as lentils, or red, kidney, and johnston beans    · Foods with 1 to 3 grams of fiber per serving:      ? 1 slice of whole-wheat, pumpernickel, or rye bread    ? 4 whole-wheat crackers    ? ½ cup of cereal with 1 to 3 grams of fiber per serving (check the nutrition label on the box)    ? 1 piece of fruit, such as an apple, banana, pear, kiwi, or orange    ? 3 dates    ? ½ cup of canned apricots, fruit cocktail, peaches, or pears    ? ½ cup of raw or cooked vegetables, such as carrots, cauliflower, cabbage, spinach, squash, or corn    When should I contact my healthcare provider? · You have questions about a high-fiber diet  · You have a change in your bowel movements  · You have an upset stomach  · You have a fever  · You have pain in your lower abdomen on the left side  · You have questions about your condition or care  CARE AGREEMENT:   You have the right to help plan your care  Learn about your health condition and how it may be treated  Discuss treatment options with your healthcare providers to decide what care you want to receive  You always have the right to refuse treatment  The above information is an  only  It is not intended as medical advice for individual conditions or treatments  Talk to your doctor, nurse or pharmacist before following any medical regimen to see if it is safe and effective for you    © Copyright [employer] 2021 Information is for End User's use only and may not be sold, redistributed or otherwise used for commercial purposes   All illustrations and images included in CareNotes® are the copyrighted property of A D A M , Inc  or [facility] [place]

## 2021-08-24 NOTE — ANESTHESIA PREPROCEDURE EVALUATION
Procedure:  COLONOSCOPY    Relevant Problems   CARDIO   (+) Hyperlipidemia      NEURO/PSYCH   (+) YISEL (generalized anxiety disorder)   (+) Schizophrenia, paranoid type ([facility] [address] )        Physical Exam    Airway    Mallampati score: III  TM Distance: >3 FB  Neck ROM: full     Dental       Cardiovascular  Rhythm: regular, Rate: abnormal,     Pulmonary  Breath sounds clear to auscultation,     Other Findings        Anesthesia Plan  ASA Score- 2     Anesthesia Type- general with ASA Monitors  Additional Monitors:   Airway Plan:           Plan Factors-Exercise tolerance (METS): >4 METS  Chart reviewed  Existing labs reviewed  Patient summary reviewed  Patient is not a current smoker  Patient not instructed to abstain from smoking on day of procedure  Patient did not smoke on day of surgery  Obstructive sleep apnea risk education given perioperatively  Induction- intravenous  Postoperative Plan-     Informed Consent- Anesthetic plan and risks discussed with patient

## 2021-08-25 ENCOUNTER — TELEPHONE (OUTPATIENT)
Dept: SURGERY | Facility: CLINIC | Age: 31
End: 2021-08-25

## 2021-08-25 NOTE — TELEPHONE ENCOUNTER
8/24 = S/P COLONO    Called and spoke to patient  He reports no F/V/N/C  He states that yes, he's had normal bowel movement  He's aware that if he has any more rectal bleeding episodes, he'll call to be scheduled for colono sooner rather than later  He's aware that if all is well, he'll still need to be scoped in one year (he did have rectal bleed, inadequate prep - double check)  He's aware that we'll change his prep for next year

## 2021-08-27 ENCOUNTER — OFFICE VISIT (OUTPATIENT)
Dept: WOUND CARE | Facility: HOSPITAL | Age: 31
End: 2021-08-27
Payer: MEDICARE

## 2021-08-27 VITALS
TEMPERATURE: 97 F | SYSTOLIC BLOOD PRESSURE: 112 MMHG | HEIGHT: 71 IN | DIASTOLIC BLOOD PRESSURE: 80 MMHG | WEIGHT: 239 LBS | HEART RATE: 72 BPM | RESPIRATION RATE: 18 BRPM | BODY MASS INDEX: 33.46 KG/M2

## 2021-08-27 DIAGNOSIS — Z87.2 H/O PILONIDAL CYST: Primary | ICD-10-CM

## 2021-08-27 PROCEDURE — 99212 OFFICE O/P EST SF 10 MIN: CPT | Performed by: SURGERY

## 2021-08-27 PROCEDURE — 99213 OFFICE O/P EST LOW 20 MIN: CPT | Performed by: SURGERY

## 2021-08-27 NOTE — PROGRESS NOTES
Patient ID: Jacek Josue is a 28 y o  male Date of Birth 1990     Chief Complaint  Chief Complaint   Patient presents with    New Patient Visit     non healing surgical wound pilonidal cyst       Allergies  Patient has no known allergies  Assessment:    H/O pilonidal cyst  Currently the wound is healed with a fragile epithelium  Follow-up p r n  Diagnoses and all orders for this visit:    H/O pilonidal cyst  -     Wound miscellaneous orders; Future              Procedures    Plan:     Wound 08/27/21 Surgical Sacrum (Active)   Wound Image Images linked 08/27/21 1010   Wound Description Epithelialization 08/27/21 1014   Lea-wound Assessment Scar Tissue 08/27/21 1014   Wound Length (cm) 0 cm 08/27/21 1014   Wound Width (cm) 0 cm 08/27/21 1014   Wound Depth (cm) 0 cm 08/27/21 1014   Wound Surface Area (cm^2) 0 cm^2 08/27/21 1014   Wound Volume (cm^3) 0 cm^3 08/27/21 1014   Calculated Wound Volume (cm^3) 0 cm^3 08/27/21 1014   Drainage Amount None 08/27/21 1014       Wound 08/27/21 Surgical Sacrum (Active)   Date First Assessed/Time First Assessed: 08/27/21 1008   Pre-Existing Wound: Yes  Primary Wound Type: Surgical  Location: Sacrum  Wound Description (Comments): pilonidal cyst  Wound Outcome: Healed       [REMOVED] Wound 03/24/21 Buttocks N/A (Removed)   Resolved Date/Resolved Time: 08/27/21 1008  Date First Assessed/Time First Assessed: 03/24/21 1734   Location: Buttocks  Wound Location Orientation: N/A  Wound Description (Comments): packed with 2 pieces of 2" paul soaked in betadine (75"each)       Subjective:        Mr Amanuel Heck is a 81-year-old gentleman that is here for evaluation of a nonhealing wound after pilonidal cystectomy  He had surgery and a margin was left open to heal by secondary tension  He did have a VAC dressing  The wound had healed at 1 point but then reopened and referred here evaluation        The following portions of the patient's history were reviewed and updated as appropriate: allergies, current medications, past family history, past medical history, past social history, past surgical history and problem list     Review of Systems   Constitutional: Negative for chills and fever  HENT: Negative for ear pain and sore throat  Eyes: Negative for pain and visual disturbance  Respiratory: Negative for cough and shortness of breath  Cardiovascular: Negative for chest pain and palpitations  Gastrointestinal: Negative for abdominal pain and vomiting  Genitourinary: Negative for dysuria and hematuria  Musculoskeletal: Negative for arthralgias and back pain  Skin: Negative for color change and rash  Neurological: Negative for seizures and syncope  Psychiatric/Behavioral: Negative for agitation  All other systems reviewed and are negative  Objective:       Wound 08/27/21 Surgical Sacrum (Active)   Wound Image Images linked 08/27/21 1010   Wound Description Epithelialization 08/27/21 1014   Lea-wound Assessment Scar Tissue 08/27/21 1014   Wound Length (cm) 0 cm 08/27/21 1014   Wound Width (cm) 0 cm 08/27/21 1014   Wound Depth (cm) 0 cm 08/27/21 1014   Wound Surface Area (cm^2) 0 cm^2 08/27/21 1014   Wound Volume (cm^3) 0 cm^3 08/27/21 1014   Calculated Wound Volume (cm^3) 0 cm^3 08/27/21 1014   Drainage Amount None 08/27/21 1014       /80   Pulse 72   Temp (!) 97 °F (36 1 °C)   Resp 18   Ht 5' 11" (1 803 m)   Wt 108 kg (239 lb)   BMI 33 33 kg/m²     Physical Exam  Vitals and nursing note reviewed  Constitutional:       General: He is not in acute distress  Appearance: He is well-developed  He is not diaphoretic  HENT:      Head: Normocephalic and atraumatic  Eyes:      Pupils: Pupils are equal, round, and reactive to light  Cardiovascular:      Rate and Rhythm: Normal rate and regular rhythm  Heart sounds: Normal heart sounds  No murmur heard       Pulmonary:      Effort: Pulmonary effort is normal  No respiratory distress  Breath sounds: Normal breath sounds  No wheezing  Abdominal:      General: Bowel sounds are normal       Palpations: Abdomen is soft  Musculoskeletal:         General: Normal range of motion  Cervical back: Normal range of motion and neck supple  Skin:     General: Skin is warm and dry  Comments: The wound is healed with fragile epithelium   Neurological:      Mental Status: He is alert and oriented to person, place, and time  Psychiatric:         Behavior: Behavior normal            Wound Instructions:  Orders Placed This Encounter   Procedures    Wound miscellaneous orders     If wound reopens call wound center for appointment  Standing Status:   Future     Standing Expiration Date:   8/27/2022        Diagnosis ICD-10-CM Associated Orders   1   H/O pilonidal cyst  Z87 2 Wound miscellaneous orders

## 2021-12-20 DIAGNOSIS — R05.9 COUGH: ICD-10-CM

## 2021-12-20 DIAGNOSIS — R50.9 FEVER, UNSPECIFIED FEVER CAUSE: Primary | ICD-10-CM

## 2021-12-20 PROCEDURE — U0003 INFECTIOUS AGENT DETECTION BY NUCLEIC ACID (DNA OR RNA); SEVERE ACUTE RESPIRATORY SYNDROME CORONAVIRUS 2 (SARS-COV-2) (CORONAVIRUS DISEASE [COVID-19]), AMPLIFIED PROBE TECHNIQUE, MAKING USE OF HIGH THROUGHPUT TECHNOLOGIES AS DESCRIBED BY CMS-2020-01-R: HCPCS | Performed by: FAMILY MEDICINE

## 2021-12-20 PROCEDURE — U0005 INFEC AGEN DETEC AMPLI PROBE: HCPCS | Performed by: FAMILY MEDICINE

## 2021-12-21 ENCOUNTER — TELEPHONE (OUTPATIENT)
Dept: FAMILY MEDICINE CLINIC | Facility: CLINIC | Age: 31
End: 2021-12-21

## 2022-05-17 ENCOUNTER — TELEPHONE (OUTPATIENT)
Dept: FAMILY MEDICINE CLINIC | Facility: CLINIC | Age: 32
End: 2022-05-17

## 2022-10-16 ENCOUNTER — TELEPHONE (OUTPATIENT)
Dept: OTHER | Facility: OTHER | Age: 32
End: 2022-10-16

## 2022-10-16 NOTE — TELEPHONE ENCOUNTER
Pt father, Jory D eJesus, called to schedule an appointment with Dr Yara Negron, asap   States that patient went to [facility] ER last night due to pus coming out of the same location where patient had a cyst

## 2022-10-19 ENCOUNTER — OFFICE VISIT (OUTPATIENT)
Dept: SURGERY | Facility: CLINIC | Age: 32
End: 2022-10-19
Payer: MEDICARE

## 2022-10-19 VITALS
HEART RATE: 130 BPM | WEIGHT: 224 LBS | SYSTOLIC BLOOD PRESSURE: 124 MMHG | OXYGEN SATURATION: 98 % | HEIGHT: 71 IN | DIASTOLIC BLOOD PRESSURE: 88 MMHG | BODY MASS INDEX: 31.36 KG/M2 | TEMPERATURE: 98 F

## 2022-10-19 DIAGNOSIS — L05.91 CHRONIC RECURRENT PILONIDAL CYST: Primary | ICD-10-CM

## 2022-10-19 PROCEDURE — 99204 OFFICE O/P NEW MOD 45 MIN: CPT | Performed by: SPECIALIST

## 2022-10-19 PROCEDURE — 99214 OFFICE O/P EST MOD 30 MIN: CPT | Performed by: SPECIALIST

## 2022-10-19 RX ORDER — CEPHALEXIN 500 MG/1
CAPSULE ORAL
COMMUNITY
Start: 2022-10-16
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
## 2022-10-25 ENCOUNTER — OFFICE VISIT (OUTPATIENT)
Dept: SURGERY | Facility: CLINIC | Age: 32
End: 2022-10-25
Payer: MEDICARE

## 2022-10-25 VITALS
TEMPERATURE: 97.3 F | HEART RATE: 116 BPM | OXYGEN SATURATION: 98 % | HEIGHT: 71 IN | BODY MASS INDEX: 31.36 KG/M2 | WEIGHT: 224 LBS

## 2022-10-25 DIAGNOSIS — L05.91 CHRONIC RECURRENT PILONIDAL CYST: Primary | ICD-10-CM

## 2022-10-25 PROCEDURE — 99212 OFFICE O/P EST SF 10 MIN: CPT | Performed by: SPECIALIST

## 2022-10-25 RX ORDER — CITALOPRAM 20 MG/1
TABLET ORAL
COMMUNITY
Start: 2022-10-12

## 2022-10-25 NOTE — PROGRESS NOTES
Romy Yusuf presents today for his 1 week visit  He was seen last week for a possible recurrent pilonidal cyst   He had a previous pilonidal cystectomy March 2021  He recently fell on the area and it started to bleed  He was seen in the emergency room he was treated locally with antibiotics and dressing changes  He was seen in our office last week  Last week the area was shaved and the wound was cleaned with a Q-tip and peroxide  Dry sterile dressing  He is here today follow-up visit  He thinks it is okay  Minimal drainage  Physical exam:  He got a haircut since seen last week  Back:  There is a dressing in place that is removed  The opening is almost 100% healed and closed  Peroxide  A  2 x 2 was placed  Impression:  Coccyx wound almost 100% healed  Plan:  No further treatment noted  He is instructed if he has any problems she should give us call we will check him out      Nice divya with a nice father

## 2022-11-01 ENCOUNTER — TELEPHONE (OUTPATIENT)
Dept: FAMILY MEDICINE CLINIC | Facility: CLINIC | Age: 32
End: 2022-11-01

## 2022-11-01 NOTE — TELEPHONE ENCOUNTER
Patient has not seen Dr Sariah De Paz since February 2021  Please call him for his Medicare wellness    Thank you

## 2022-11-04 ENCOUNTER — OFFICE VISIT (OUTPATIENT)
Dept: FAMILY MEDICINE CLINIC | Facility: CLINIC | Age: 32
End: 2022-11-04

## 2022-11-04 VITALS
HEIGHT: 71 IN | HEART RATE: 118 BPM | DIASTOLIC BLOOD PRESSURE: 78 MMHG | OXYGEN SATURATION: 98 % | WEIGHT: 224.2 LBS | BODY MASS INDEX: 31.39 KG/M2 | SYSTOLIC BLOOD PRESSURE: 118 MMHG

## 2022-11-04 DIAGNOSIS — L30.9 DERMATITIS: ICD-10-CM

## 2022-11-04 DIAGNOSIS — B35.1 ONYCHOMYCOSIS: Primary | ICD-10-CM

## 2022-11-04 DIAGNOSIS — E66.9 OBESITY (BMI 30-39.9): ICD-10-CM

## 2022-11-04 DIAGNOSIS — F20.0 SCHIZOPHRENIA, PARANOID TYPE (HCC): ICD-10-CM

## 2022-11-04 RX ORDER — TRIAMCINOLONE ACETONIDE 1 MG/G
CREAM TOPICAL 2 TIMES DAILY
Qty: 30 G | Refills: 0 | Status: SHIPPED | OUTPATIENT
Start: 2022-11-04

## 2022-11-08 PROBLEM — E66.9 OBESITY (BMI 30-39.9): Status: ACTIVE | Noted: 2022-11-08

## 2022-11-08 NOTE — PROGRESS NOTES
Assessment/Plan:    58-year-old male with: paranoid schizophrenia dermatitis onychomycosis along with obesity continue current medications discussed supportive care return parameters will add topical steroid will refer to Podiatry discussed supportive care return parameters otherwise    No problem-specific Assessment & Plan notes found for this encounter  Diagnoses and all orders for this visit:    Onychomycosis  -     Ambulatory Referral to Podiatry; Future    Dermatitis  -     triamcinolone (KENALOG) 0 1 % cream; Apply topically 2 (two) times a day    Schizophrenia, paranoid type (HCC)    Obesity (BMI 30-39  9)          Subjective:     Chief Complaint   Patient presents with   • Follow-up     Patient is here to follow up ER visit    • Rash     Rash on both legs not sure what it is from    • Nail Problem     Toenail problems on left foot         Patient ID: Luna Marlow is a 33 y o  male  Patient is a 58-year-old male who presents for follow-up on paranoid schizophrenia dermatitis onychomycosis along with obesity he admits he is having some issues with rash in along with his nails no fevers chills nausea vomiting tolerating p o  intake no other complaints at this time    Rash        The following portions of the patient's history were reviewed and updated as appropriate: allergies, current medications, past family history, past medical history, past social history, past surgical history and problem list     Review of Systems   Constitutional: Negative  HENT: Negative  Eyes: Negative  Respiratory: Negative  Cardiovascular: Negative  Gastrointestinal: Negative  Endocrine: Negative  Genitourinary: Negative  Musculoskeletal: Negative  Skin: Positive for rash  Allergic/Immunologic: Negative  Neurological: Negative  Hematological: Negative  Psychiatric/Behavioral: Negative  All other systems reviewed and are negative          Objective:      /78 (BP Location: Right arm, Patient Position: Sitting, Cuff Size: Standard)   Pulse (!) 118   Ht 5' 11" (1 803 m)   Wt 102 kg (224 lb 3 2 oz)   SpO2 98%   BMI 31 27 kg/m²          Physical Exam  Constitutional:       Appearance: He is well-developed  HENT:      Head: Atraumatic  Right Ear: External ear normal       Left Ear: External ear normal    Eyes:      Conjunctiva/sclera: Conjunctivae normal       Pupils: Pupils are equal, round, and reactive to light  Cardiovascular:      Rate and Rhythm: Normal rate and regular rhythm  Heart sounds: Normal heart sounds  Pulmonary:      Effort: Pulmonary effort is normal  No respiratory distress  Breath sounds: Normal breath sounds  Abdominal:      General: There is no distension  Palpations: Abdomen is soft  Tenderness: There is no abdominal tenderness  There is no guarding or rebound  Musculoskeletal:         General: Normal range of motion  Cervical back: Normal range of motion  Skin:     General: Skin is warm and dry  Neurological:      Mental Status: He is alert and oriented to person, place, and time  Cranial Nerves: No cranial nerve deficit  Psychiatric:         Behavior: Behavior normal          Thought Content:  Thought content normal          Judgment: Judgment normal

## 2022-12-12 ENCOUNTER — OFFICE VISIT (OUTPATIENT)
Dept: SURGERY | Facility: CLINIC | Age: 32
End: 2022-12-12

## 2022-12-12 VITALS
HEIGHT: 71 IN | WEIGHT: 223 LBS | BODY MASS INDEX: 31.22 KG/M2 | SYSTOLIC BLOOD PRESSURE: 132 MMHG | DIASTOLIC BLOOD PRESSURE: 94 MMHG | HEART RATE: 112 BPM

## 2022-12-12 DIAGNOSIS — L08.89 PILONIDAL DISEASE OF NATAL CLEFT: Primary | ICD-10-CM

## 2022-12-12 RX ORDER — SULFAMETHOXAZOLE AND TRIMETHOPRIM 800; 160 MG/1; MG/1
TABLET ORAL 2 TIMES DAILY
COMMUNITY
Start: 2022-12-11 | End: 2022-12-18

## 2022-12-12 RX ORDER — SULFAMETHOXAZOLE AND TRIMETHOPRIM 800; 160 MG/1; MG/1
TABLET ORAL
COMMUNITY
Start: 2022-12-11
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
## 2023-02-01 ENCOUNTER — TELEPHONE (OUTPATIENT)
Dept: FAMILY MEDICINE CLINIC | Facility: CLINIC | Age: 33
End: 2023-02-01

## 2023-02-06 ENCOUNTER — TELEPHONE (OUTPATIENT)
Dept: FAMILY MEDICINE CLINIC | Facility: CLINIC | Age: 33
End: 2023-02-06

## 2024-02-21 PROBLEM — Z20.822 COUGH WITH EXPOSURE TO COVID-19 VIRUS: Status: RESOLVED | Noted: 2020-09-02 | Resolved: 2024-02-21

## 2024-02-21 PROBLEM — R05.8 COUGH WITH EXPOSURE TO COVID-19 VIRUS: Status: RESOLVED | Noted: 2020-09-02 | Resolved: 2024-02-21

## 2024-03-07 ENCOUNTER — OFFICE VISIT (OUTPATIENT)
Dept: FAMILY MEDICINE CLINIC | Facility: CLINIC | Age: 34
End: 2024-03-07
Payer: MEDICARE

## 2024-03-07 VITALS
BODY MASS INDEX: 31.36 KG/M2 | TEMPERATURE: 99.4 F | HEART RATE: 92 BPM | SYSTOLIC BLOOD PRESSURE: 110 MMHG | HEIGHT: 71 IN | OXYGEN SATURATION: 97 % | DIASTOLIC BLOOD PRESSURE: 60 MMHG | WEIGHT: 224 LBS

## 2024-03-07 DIAGNOSIS — F20.0 SCHIZOPHRENIA, PARANOID TYPE (HCC): ICD-10-CM

## 2024-03-07 DIAGNOSIS — J06.9 UPPER RESPIRATORY TRACT INFECTION, UNSPECIFIED TYPE: Primary | ICD-10-CM

## 2024-03-07 PROCEDURE — G2211 COMPLEX E/M VISIT ADD ON: HCPCS | Performed by: FAMILY MEDICINE

## 2024-03-07 PROCEDURE — 99213 OFFICE O/P EST LOW 20 MIN: CPT | Performed by: FAMILY MEDICINE

## 2024-03-07 PROCEDURE — 87636 SARSCOV2 & INF A&B AMP PRB: CPT | Performed by: FAMILY MEDICINE

## 2024-03-07 RX ORDER — AMOXICILLIN 500 MG/1
500 TABLET, FILM COATED ORAL 3 TIMES DAILY
Qty: 21 TABLET | Refills: 0 | Status: SHIPPED | OUTPATIENT
Start: 2024-03-07 | End: 2024-03-14

## 2024-03-07 NOTE — LETTER
March 7, 2024     Patient: Solo Pierre  YOB: 1990  Date of Visit: 3/7/2024      To Whom it May Concern:    Solo Pierre is under my professional care. Solo was seen in my office on 3/7/2024. Solo may return to work on 3/11/2024 .    If you have any questions or concerns, please don't hesitate to call.         Sincerely,          Stepan Patel MD        CC: No Recipients

## 2024-03-08 LAB
FLUAV RNA RESP QL NAA+PROBE: NEGATIVE
FLUBV RNA RESP QL NAA+PROBE: POSITIVE
SARS-COV-2 RNA RESP QL NAA+PROBE: NEGATIVE

## 2024-03-08 NOTE — RESULT ENCOUNTER NOTE
Please call patient. Test was positive for Flu B, which will not respond to tamiflu. Recommend supportive care and call back if not improving or worsening

## 2024-03-11 NOTE — PROGRESS NOTES
"Assessment/Plan:    32 y/o male with: Acute URI and paranoid schizophrenia. Will continue meds. And check for COVID and Flu. Discussed supportive care and return parameters. Will give script for antibiotic in case symptoms are not improving or worsening.    No problem-specific Assessment & Plan notes found for this encounter.       Diagnoses and all orders for this visit:    Upper respiratory tract infection, unspecified type  -     COVID/FLU; Future  -     COVID/FLU  -     amoxicillin (AMOXIL) 500 MG tablet; Take 1 tablet (500 mg total) by mouth 3 (three) times a day for 7 days    Schizophrenia, paranoid type (HCC)          Subjective:     Chief Complaint   Patient presents with    Cold Like Symptoms     Cough , sore throat , congestion , fatigue x 2 days         Patient ID: Solo Pierre is a 33 y.o. male.    Patient is a 32 y/o male with paranoid schizophrenia who presents complaining of cough congestion sinus pressure no fevers chills nausea or vomiting.        The following portions of the patient's history were reviewed and updated as appropriate: allergies, current medications, past family history, past medical history, past social history, past surgical history and problem list.    Review of Systems   Constitutional: Negative.    HENT:  Positive for congestion and sinus pressure.    Eyes: Negative.    Respiratory:  Positive for cough.    Cardiovascular: Negative.    Gastrointestinal: Negative.    Endocrine: Negative.    Genitourinary: Negative.    Musculoskeletal: Negative.    Allergic/Immunologic: Negative.    Neurological: Negative.    Hematological: Negative.    Psychiatric/Behavioral: Negative.     All other systems reviewed and are negative.        Objective:      /60   Pulse 92   Temp 99.4 °F (37.4 °C)   Ht 5' 11\" (1.803 m)   Wt 102 kg (224 lb)   SpO2 97%   BMI 31.24 kg/m²          Physical Exam  Constitutional:       Appearance: He is well-developed.   HENT:      Head: Atraumatic.     "  Right Ear: External ear normal.      Left Ear: External ear normal.   Eyes:      Conjunctiva/sclera: Conjunctivae normal.      Pupils: Pupils are equal, round, and reactive to light.   Cardiovascular:      Rate and Rhythm: Normal rate and regular rhythm.      Heart sounds: Normal heart sounds.   Pulmonary:      Effort: Pulmonary effort is normal. No respiratory distress.      Breath sounds: Normal breath sounds.   Abdominal:      General: Bowel sounds are normal. There is no distension.      Palpations: Abdomen is soft.      Tenderness: There is no abdominal tenderness. There is no guarding or rebound.   Musculoskeletal:         General: Normal range of motion.      Cervical back: Normal range of motion.   Skin:     General: Skin is warm and dry.   Neurological:      Mental Status: He is alert and oriented to person, place, and time.      Cranial Nerves: No cranial nerve deficit.   Psychiatric:         Behavior: Behavior normal.         Thought Content: Thought content normal.         Judgment: Judgment normal.

## 2024-03-15 ENCOUNTER — TELEPHONE (OUTPATIENT)
Age: 34
End: 2024-03-15

## 2024-03-15 NOTE — TELEPHONE ENCOUNTER
Patients father called in requesting a letter to exempt patient from doing jury duty due to Schizophrenia and paranoia. Please advise.   Due to report 4/15/2024   Juror id # [id number] sequence  # [phone]  Fax number # [phone]     Please call father at [phone]

## 2024-03-15 NOTE — LETTER
Date: 3/15/2024    To whom it may concern:     This is to certify that Sloo Pierre has been under my care for the following diagnosis: Schizophrenia and paranoia.         I feel that Solo Pierre is unable to serve on Jury Duty at this time for the above mentioned medical reasons.                  Sincerely,  Stepan Patel MD

## 2024-03-20 RX ORDER — AMOXICILLIN 500 MG/1
500 CAPSULE ORAL 3 TIMES DAILY
COMMUNITY
Start: 2024-03-07 | End: 2024-03-21 | Stop reason: ALTCHOICE

## 2024-03-22 ENCOUNTER — TELEPHONE (OUTPATIENT)
Dept: PSYCHIATRY | Facility: CLINIC | Age: 34
End: 2024-03-22

## 2024-03-22 ENCOUNTER — OFFICE VISIT (OUTPATIENT)
Dept: FAMILY MEDICINE CLINIC | Facility: CLINIC | Age: 34
End: 2024-03-22
Payer: MEDICARE

## 2024-03-22 VITALS
BODY MASS INDEX: 29.31 KG/M2 | SYSTOLIC BLOOD PRESSURE: 109 MMHG | OXYGEN SATURATION: 96 % | WEIGHT: 216.4 LBS | DIASTOLIC BLOOD PRESSURE: 59 MMHG | HEART RATE: 77 BPM | HEIGHT: 72 IN | TEMPERATURE: 98.7 F

## 2024-03-22 DIAGNOSIS — F20.0 SCHIZOPHRENIA, PARANOID TYPE (HCC): Primary | Chronic | ICD-10-CM

## 2024-03-22 DIAGNOSIS — F41.1 GAD (GENERALIZED ANXIETY DISORDER): ICD-10-CM

## 2024-03-22 PROCEDURE — G2211 COMPLEX E/M VISIT ADD ON: HCPCS | Performed by: FAMILY MEDICINE

## 2024-03-22 PROCEDURE — 99214 OFFICE O/P EST MOD 30 MIN: CPT | Performed by: FAMILY MEDICINE

## 2024-03-22 RX ORDER — CLONAZEPAM 0.5 MG/1
0.5 TABLET ORAL DAILY PRN
Qty: 30 TABLET | Refills: 0 | Status: SHIPPED | OUTPATIENT
Start: 2024-03-22

## 2024-03-22 RX ORDER — CLOZAPINE 100 MG/1
200 TABLET ORAL
Qty: 180 TABLET | Refills: 1 | Status: SHIPPED | OUTPATIENT
Start: 2024-03-22

## 2024-03-22 RX ORDER — CITALOPRAM 20 MG/1
20 TABLET ORAL DAILY
Qty: 90 TABLET | Refills: 1 | Status: SHIPPED | OUTPATIENT
Start: 2024-03-22

## 2024-03-22 NOTE — TELEPHONE ENCOUNTER
"Behavioral Health Outpatient Intake Questions    Referred By   :     Please advise interviewee that they need to answer all questions truthfully to allow for best care, and any misrepresentations of information may affect their ability to be seen at this clinic   => Was this discussed? Yes     If Minor Child (under age 18)    Who is/are the legal guardian(s) of the child?     Is there a custody agreement? No     If \"YES\"- Custody orders must be obtained prior to scheduling the first appointment  In addition, Consent to Treatment must be signed by all legal guardians prior to scheduling the first appointment    If \"NO\"- Consent to Treatment must be signed by all legal guardians prior to scheduling the first appointment    Behavioral Health Outpatient Intake History -     Presenting Problem (in patient's own words):   Old  Was no longer able to take insurance, needs new medication regimn    Are there any communication barriers for this patient?     No                                               If yes, please describe barriers:   If there is a unique situation, please refer to Carmelo Lane/Sherrie Mas for final determination.    Are you taking any psychiatric medications? Yes     If \"YES\" -What are they Solexa, Klozapine, Klozeral     If \"YES\" -Who prescribes?     Has the Patient previously received outpatient Talk Therapy or Medication Management from [facility]  No        If \"YES\"- When, Where and with Whom?         If \"NO\" -Has Patient received these services elsewhere?       If \"YES\" -When, Where, and with Whom?    Has the Patient abused alcohol or other substances in the last 6 months ? No  No concerns of substance abuse are reported.     If \"YES\" -What substance, How much, How often?     If illegal substance: Refer to [facility] (for [facility]) or [facility].   If Alcohol in excess of 10 drinks per week:  Refer to [facility] (for [facility]) or [facility]    Legal History-     Is this treatment " "court ordered? No   If \"yes \"send to :  Talk Therapy : Send to Carmelo Lane/Sherrie Mas for final determination   Med Management: Send to Dr Cheek for final determination     Has the Patient been convicted of a felony?  No   If \"Yes\" send to -When, What?  Talk Therapy : Send to Carmelo Lane/Sherrie Mas for final determination   Med Management: Send to Dr Cheek for final determination     ACCEPTED as a patient Yes  If \"Yes\" Appointment Date: 4/10/2024 @ 2:00 Kul[place]inder    Referred Elsewhere? No  If “Yes” - (Where? Ex: [facility], [facility], [facility], [facility], [facility], etc.)       Name of Insurance Co: Medicare & Medicaid  Insurance ID# [id number]   Insurance Phone #   If ins is primary or secondary? Primary  If patient is a minor, parents information such as Name, D.O.B of guarantor.  "

## 2024-03-26 NOTE — PROGRESS NOTES
Assessment/Plan:    32 y/o male with: schizophrenia along with YISEL. Will refer to Psychiatrist and  will continue meds. And monitor labs until he can get in to a new psychiatrist. Discussed supportive care and return parameters.     No problem-specific Assessment & Plan notes found for this encounter.       Diagnoses and all orders for this visit:    Schizophrenia, paranoid type (HCC)  -     cariprazine (VRAYLAR) 1.5 MG capsule; Take 1 capsule (1.5 mg total) by mouth daily  -     citalopram (CeleXA) 20 mg tablet; Take 1 tablet (20 mg total) by mouth daily  -     cloZAPine (CLOZARIL) 100 mg tablet; Take 2 tablets (200 mg total) by mouth daily at bedtime  -     clonazePAM (KlonoPIN) 0.5 mg tablet; Take 1 tablet (0.5 mg total) by mouth daily as needed for anxiety  -     Ambulatory referral to Psych Services; Future  -     Ambulatory Referral to Social Work Care Management Program; Future  -     CBC and differential; Standing  -     CBC and differential    YISEL (generalized anxiety disorder)  -     cariprazine (VRAYLAR) 1.5 MG capsule; Take 1 capsule (1.5 mg total) by mouth daily  -     citalopram (CeleXA) 20 mg tablet; Take 1 tablet (20 mg total) by mouth daily  -     cloZAPine (CLOZARIL) 100 mg tablet; Take 2 tablets (200 mg total) by mouth daily at bedtime  -     clonazePAM (KlonoPIN) 0.5 mg tablet; Take 1 tablet (0.5 mg total) by mouth daily as needed for anxiety  -     Ambulatory referral to Psych Services; Future  -     Ambulatory Referral to Social Work Care Management Program; Future  -     CBC and differential; Standing  -     CBC and differential    Other orders  -     Discontinue: amoxicillin (AMOXIL) 500 mg capsule; Take 500 mg by mouth 3 (three) times a day          Subjective:     Chief Complaint   Patient presents with    Follow-up     Patient has been out of [facility] for 2 months. Patient is wanting to know if he could have the medication filled. No further concerns, ng        Patient ID: Solo  VINNY Pierre is a 33 y.o. male.    Patient is a 34 y/o male who presents for follow-up on schizophrenia along with YISEL. Pt admits that his psychiatrist left and he needs refills until he can get in with a new psychiatrist he admits being stable on meds. No fevers chills nausea or vomiting.        The following portions of the patient's history were reviewed and updated as appropriate: allergies, current medications, past family history, past medical history, past social history, past surgical history and problem list.    Review of Systems   Constitutional: Negative.    HENT: Negative.     Eyes: Negative.    Respiratory: Negative.     Cardiovascular: Negative.    Gastrointestinal: Negative.    Endocrine: Negative.    Genitourinary: Negative.    Musculoskeletal: Negative.    Allergic/Immunologic: Negative.    Neurological: Negative.    Hematological: Negative.    Psychiatric/Behavioral: Negative.     All other systems reviewed and are negative.        Objective:      /59 (BP Location: Right arm, Patient Position: Sitting, Cuff Size: Large)   Pulse 77   Temp 98.7 °F (37.1 °C) (Temporal)   Ht 6' (1.829 m)   Wt 98.2 kg (216 lb 6.4 oz)   SpO2 96%   BMI 29.35 kg/m²          Physical Exam  Constitutional:       Appearance: He is well-developed.   HENT:      Head: Atraumatic.      Right Ear: External ear normal.      Left Ear: External ear normal.   Eyes:      Conjunctiva/sclera: Conjunctivae normal.      Pupils: Pupils are equal, round, and reactive to light.   Cardiovascular:      Rate and Rhythm: Normal rate and regular rhythm.      Heart sounds: Normal heart sounds.   Pulmonary:      Effort: Pulmonary effort is normal. No respiratory distress.      Breath sounds: Normal breath sounds.   Abdominal:      General: There is no distension.      Palpations: Abdomen is soft.      Tenderness: There is no abdominal tenderness. There is no guarding or rebound.   Musculoskeletal:         General: Normal range of  motion.      Cervical back: Normal range of motion.   Skin:     General: Skin is warm and dry.   Neurological:      Mental Status: He is alert and oriented to person, place, and time.      Cranial Nerves: No cranial nerve deficit.   Psychiatric:         Behavior: Behavior normal.         Thought Content: Thought content normal.         Judgment: Judgment normal.

## 2024-04-03 ENCOUNTER — PATIENT OUTREACH (OUTPATIENT)
Dept: FAMILY MEDICINE CLINIC | Facility: CLINIC | Age: 34
End: 2024-04-03

## 2024-04-03 NOTE — PROGRESS NOTES
MARYBETH MORALES received a referral from patient's PCP regarding patient's need for assistance establishing psych services. Per chart review, pt was accepted as a pt by [facility] and does have an appt scheduled for 4/10/24. MARYBETH MORALES LVM requesting a call in return at patient's earliest convenience to ensure no additional resources or support is needed. Will continue attempts to contact pt.    --    Received a call in return from pt. Patient confirmed he does have an appt with [facility] next week. Patient denies having lack of access to transportation to this appt and states he has no additional concerns. MARYBETH MORALES offered to follow up with pt after his appt next week to see how it went and ensure he will be utilizing their services ongoing. Patient agreeable to plan.

## 2024-04-10 ENCOUNTER — OFFICE VISIT (OUTPATIENT)
Dept: PSYCHIATRY | Facility: CLINIC | Age: 34
End: 2024-04-10

## 2024-04-10 VITALS — WEIGHT: 207.7 LBS | BODY MASS INDEX: 28.17 KG/M2

## 2024-04-10 DIAGNOSIS — Z51.81 ENCOUNTER FOR MEDICATION MONITORING: ICD-10-CM

## 2024-04-10 DIAGNOSIS — F20.0 SCHIZOPHRENIA, PARANOID TYPE (HCC): Primary | Chronic | ICD-10-CM

## 2024-04-10 DIAGNOSIS — F41.1 GAD (GENERALIZED ANXIETY DISORDER): ICD-10-CM

## 2024-04-10 DIAGNOSIS — F99 INSOMNIA DUE TO OTHER MENTAL DISORDER: ICD-10-CM

## 2024-04-10 DIAGNOSIS — F51.05 INSOMNIA DUE TO OTHER MENTAL DISORDER: ICD-10-CM

## 2024-04-10 DIAGNOSIS — K59.03 DRUG-INDUCED CONSTIPATION: ICD-10-CM

## 2024-04-10 DIAGNOSIS — Z13.228 ENCOUNTER FOR SCREENING FOR METABOLIC DISORDER: ICD-10-CM

## 2024-04-10 RX ORDER — CITALOPRAM 20 MG/1
20 TABLET ORAL
Qty: 90 TABLET | Refills: 0 | Status: SHIPPED | OUTPATIENT
Start: 2024-04-10

## 2024-04-10 RX ORDER — TRAZODONE HYDROCHLORIDE 50 MG/1
50 TABLET ORAL
Qty: 90 TABLET | Refills: 0 | Status: SHIPPED | OUTPATIENT
Start: 2024-04-10

## 2024-04-10 RX ORDER — SENNA AND DOCUSATE SODIUM 50; 8.6 MG/1; MG/1
1 TABLET, FILM COATED ORAL 2 TIMES DAILY
Qty: 180 TABLET | Refills: 0 | Status: SHIPPED | OUTPATIENT
Start: 2024-04-10

## 2024-04-10 RX ORDER — CITALOPRAM 40 MG/1
40 TABLET ORAL DAILY
Qty: 90 TABLET | Refills: 0 | Status: SHIPPED | OUTPATIENT
Start: 2024-04-10

## 2024-04-10 RX ORDER — CLOZAPINE 100 MG/1
200 TABLET ORAL
Qty: 60 TABLET | Refills: 2 | Status: SHIPPED | OUTPATIENT
Start: 2024-04-10

## 2024-04-10 NOTE — BH TREATMENT PLAN
TREATMENT PLAN (Medication Management Only)        [facility]    Name and Date of Birth:  Solo Pierre 33 y.o. 1990  Date of Treatment Plan: April 10, 2024  Diagnosis/Diagnoses:    1. Schizophrenia, paranoid type (HCC)    2. YISEL (generalized anxiety disorder)    3. Drug-induced constipation    4. Insomnia due to other mental disorder    5. Encounter for medication monitoring    6. Encounter for screening for metabolic disorder      Strengths/Personal Resources for Self-Care: supportive family, supportive friends, taking medications as prescribed, ability to adapt to life changes, ability to communicate well, ability to understand psychiatric illness, average or above intelligence, good physical health, motivation for treatment, special hobby/interest, willingness to work on problems, work skills.  Area/Areas of need (in own words): Maintain control of symptoms of anxiety. Maintain control of symptoms of psychosis. Improve sleep.  1. Long Term Goal: Maintain control of symptoms of anxiety. Maintain control of symptoms of psychosis. Improve sleep..  Target Date:6 months - 10/10/2024  Person/Persons responsible for completion of goal: Dr. Lashell Banda  2.  Short Term Objective (s) - How will we reach this goal?:   A. Provider new recommended medication/dosage changes and/or continue medication(s): Continue Vraylar 1.5 mg daily, Clozapine 200 mg at bedtime, Celexa 40 mg daily and 20 mg after dinner, klonopin 0.5 mg twice daily. Start trazodone 50 mg at bedtime as needed for insomnia. Start Senokot 8.6 mg twice daily.  Take medications as prescribed  Attend psychiatry appointments regularly  Try sleep hygiene techniques  Avoid alcohol   Avoid drugs   Eat a healthy diet   Exercise regularly  Try relaxation techniques  Target Date:6 months - 10/10/2024  Person/Persons Responsible for Completion of Goal: Solo  Progress Towards Goals: stable, continuing  treatment  Treatment Modality: Medication management every 1 to 3 months  Review due 180 days from date of this plan: 6 months - 10/10/2024  Expected length of service: ongoing treatment  My Physician/PA/NP and I have developed this plan together and I agree to work on the goals and objectives. I understand the treatment goals that were developed for my treatment.

## 2024-04-10 NOTE — PSYCH
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 constipation    Routine blood work and monitoring including EKG, CMP, TSH, Lipid Panel, and HgA1c was ordered. Patient will be registered with [facility] and will have a standing CBC ordered on a monthly basis    Although patient's acute lethality risk is low, long-term/chronic lethality risk is mildly elevated in the presence of minimal symptoms of depression and mild symptoms of anxiety. At the current moment, Solo is future-oriented, forward-thinking, and demonstrates ability to act in a self-preserving manner as evidenced by volitionally presenting to the clinic today, seeking treatment. At this juncture, inpatient hospitalization is not currently warranted. To mitigate future risk, patient should adhere to the recommendations of this writer, avoid alcohol/illicit substance use, utilize community-based resources and familiar support and prioritize mental health treatment.     Based on today's assessment and clinical criteria, Solo CASILLAS Ignacio contracts for safety and is not an imminent risk of harm to self or others. Outpatient level of care is deemed appropriate at this present time. Solo understands that if they are no longer able to contract for safety, they need to call/contact  the outpatient office including this writer, call/contact crisis and/orattend to the nearest Emergency Department for immediate evaluation.    Medications Risks/Benefits:      Risks, Benefits And Possible Side Effects Of Medications:    Risks, benefits, and possible side effects of medications explained to Solo and he verbalizes understanding and agreement for treatment.    Controlled Medication Discussion:     Solo has been filling controlled prescriptions on time as prescribed according to [place] Prescription Drug Monitoring Program    Psychotherapy Provided:     Individual psychotherapy provided: Yes  Recent stressors discussed with Solo including family stressors.  Supportive therapy provided.      Treatment Plan:    Completed and signed during the session: Yes - with Solo    Visit Time    Visit Start Time: 2:00 PM  Visit Stop Time: 3:30 PM  Total Visit Duration:  90 minutes    This note was shared with patient.    Sathya Lobo MD 4/10/24    This note has been constructed using a voice recognition system. There may be translation, syntax, or grammatical errors. If you have any questions, please contact the dictating provider.

## 2024-04-10 NOTE — BH CRISIS PLAN
Client Name: Solo Pierre       Client YOB: 1990    Libby Safety Plan      Creation Date: 4/10/24 Update Date: 4/10/24   Created By: Sathya Lobo MD Last Updated By: Sathya Lobo MD      Step 1: Warning Signs:   Warning Signs   When I'm not social with others   When I'm not doing things   When I'm sleeping too much            Step 2: Internal Coping Strategies:   Internal Coping Strategies   Driving   Working on cars   Reading magazines   Exercising            Step 3: People and social settings that provide distraction:   Name Contact Information   Angelica Ignacio [phone]   Richard Pierre [phone]    Places   The park   The mall           Step 4: People whom I can ask for help during a crisis:      Name Contact Information    Angelica Ignacio [phone]    Richard Pierre [phone]      Step 5: Professionals or agencies I can contact during a crisis:      Clinican/Agency Name Phone Emergency Contact    Dr. Lobo [phone]       [facility] Emergency Department Emergency Department Phone Emergency Department Address    [phone]        Crisis Phone Numbers:   Suicide Prevention Lifeline: Call or Text  910 Crisis Text Line: Text HOME to 179-447   Please note: Some [facility] do not have a separate number for Child/Adolescent specific crisis. If your county is not listed under Child/Adolescent, please call the adult number for your county      Adult Crisis Numbers: Child/Adolescent Crisis Numbers   [facility]: [phone] [facility]: [phone]   [facility]: [phone] [facility]: [phone]   [facility]: [phone] [place]: [phone]   [facility]: [phone] [place] [facility]: [phone]   [place] [facility]: [phone]   [facility]: [phone]   [facility]: [phone]   [facility]: [phone] (daytime) [phone] (after hours,  weekends, holidays)      Step 6: Making the environment safer (plan for lethal means safety):   Patient did not identify any lethal methods: Yes     Optional: What is most important to me and worth living for?   I want to keep on going, meet new people, have relationships and friendships.     Libby Safety Plan. Kathryn Méndez and Rachid Perea. Used with permission of the authors.

## 2024-04-11 ENCOUNTER — PATIENT OUTREACH (OUTPATIENT)
Dept: FAMILY MEDICINE CLINIC | Facility: CLINIC | Age: 34
End: 2024-04-11

## 2024-04-11 NOTE — PATIENT INSTRUCTIONS
Please present for your previously scheduled appointment approximately 15 minutes prior to appointment time. If you are running late or are unable to attend your appointment, please call our [place] office at [phone] or our [place] office at [phone] if applicable to notify the office of your absence.    If you are in psychological crisis including not limited to suicidal/homicidal thoughts or thoughts of self-injury, do not hesitate to call/contact your County Crisis hotline (see below) or attend to the nearest emergency department.  [facility] Crisis: [phone]  [facility] Crisis: [phone]  [facility] Crisis: [phone]  [facility] Crisis: [phone]  [facility] Crisis: [phone]  [facility] Crisis: [phone]  [facility] Crisis: [phone]  National Suicide Prevention Hotline: 1-620.848.9057

## 2024-04-11 NOTE — PROGRESS NOTES
MARYBETH MORALES contacted patient at this time to follow up. Patient states he did have his appt with [facility] yesterday. He will continue seeing them for services and denies having any additional needs for social work case management at this time. Referral will be closed and MARYBETH MORALES removed from care team. Will be available if needed in the future via new order.

## 2024-05-01 ENCOUNTER — OFFICE VISIT (OUTPATIENT)
Dept: LAB | Age: 34
End: 2024-05-01
Payer: MEDICARE

## 2024-05-01 ENCOUNTER — OFFICE VISIT (OUTPATIENT)
Dept: PSYCHIATRY | Facility: CLINIC | Age: 34
End: 2024-05-01

## 2024-05-01 DIAGNOSIS — F41.1 GAD (GENERALIZED ANXIETY DISORDER): ICD-10-CM

## 2024-05-01 DIAGNOSIS — Z51.81 ENCOUNTER FOR MEDICATION MONITORING: ICD-10-CM

## 2024-05-01 DIAGNOSIS — F20.0 SCHIZOPHRENIA, PARANOID TYPE (HCC): Primary | Chronic | ICD-10-CM

## 2024-05-01 LAB
ATRIAL RATE: 56 BPM
P AXIS: 14 DEGREES
PR INTERVAL: 132 MS
QRS AXIS: 63 DEGREES
QRSD INTERVAL: 86 MS
QT INTERVAL: 444 MS
QTC INTERVAL: 428 MS
T WAVE AXIS: 46 DEGREES
VENTRICULAR RATE: 56 BPM

## 2024-05-01 PROCEDURE — 93005 ELECTROCARDIOGRAM TRACING: CPT

## 2024-05-01 PROCEDURE — 93010 ELECTROCARDIOGRAM REPORT: CPT | Performed by: INTERNAL MEDICINE
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
## 2024-05-02 VITALS — BODY MASS INDEX: 28.36 KG/M2 | WEIGHT: 209.1 LBS

## 2024-05-02 NOTE — PATIENT INSTRUCTIONS
Please present for your previously scheduled appointment approximately 15 minutes prior to appointment time. If you are running late or are unable to attend your appointment, please call our [place] office at [phone] or our [place] office at [phone] if applicable to notify the office of your absence.    If you are in psychological crisis including not limited to suicidal/homicidal thoughts or thoughts of self-injury, do not hesitate to call/contact your County Crisis hotline (see below) or attend to the nearest emergency department.  [facility] Crisis: [phone]  [facility] Crisis: [phone]  [facility] Crisis: [phone]  [facility] Crisis: [phone]  [facility] Crisis: [phone]  [facility] Crisis: [phone]  [facility] Crisis: [phone]  National Suicide Prevention Hotline: 1-674.219.9644

## 2024-05-31 ENCOUNTER — TELEPHONE (OUTPATIENT)
Dept: PSYCHIATRY | Facility: CLINIC | Age: 34
End: 2024-05-31

## 2024-05-31 NOTE — TELEPHONE ENCOUNTER
This writer called the patient Solo Pierre today 5/31/2024 at 10:00 AM. The number went to voicemail.    At this time, [facility] has not received this month's CBC results for the patient  Solo Pierre.     A message was left at Phone: [phone] stating it is imperative that we receive the CBC results so we can continue to prescribe clozapine. In this voicemail, patient was given the office fax number [phone].    Sathya Lobo MD

## 2024-06-06 ENCOUNTER — OFFICE VISIT (OUTPATIENT)
Dept: PSYCHIATRY | Facility: CLINIC | Age: 34
End: 2024-06-06

## 2024-06-06 VITALS — BODY MASS INDEX: 28.18 KG/M2 | WEIGHT: 207.8 LBS

## 2024-06-06 DIAGNOSIS — F41.1 GAD (GENERALIZED ANXIETY DISORDER): ICD-10-CM

## 2024-06-06 DIAGNOSIS — F20.0 SCHIZOPHRENIA, PARANOID TYPE (HCC): Primary | Chronic | ICD-10-CM

## 2024-06-06 RX ORDER — CLOZAPINE 100 MG/1
200 TABLET ORAL
Qty: 60 TABLET | Refills: 2 | Status: SHIPPED | OUTPATIENT
Start: 2024-06-06

## 2024-06-06 RX ORDER — CITALOPRAM 20 MG/1
20 TABLET ORAL 2 TIMES DAILY
Qty: 60 TABLET | Refills: 2 | Status: SHIPPED | OUTPATIENT
Start: 2024-06-06

## 2024-06-06 RX ORDER — CLONAZEPAM 0.5 MG/1
0.5 TABLET ORAL 2 TIMES DAILY PRN
Qty: 30 TABLET | Refills: 0 | Status: SHIPPED | OUTPATIENT
Start: 2024-06-17

## 2024-06-06 NOTE — PSYCH
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 Treatment Plan not due at this session    Visit Time    Visit Start Time: 9:00 AM  Visit Stop Time: 9:45 AM  Total Visit Duration: 45 minutes    This note was shared with patient.    Sathya Lobo MD 06/06/24    This note has been constructed using a voice recognition system. There may be translation, syntax, or grammatical errors. If you have any questions, please contact the dictating provider.

## 2024-06-06 NOTE — PATIENT INSTRUCTIONS
Please present for your previously scheduled appointment approximately 15 minutes prior to appointment time. If you are running late or are unable to attend your appointment, please call our [place] office at [phone] or our [place] office at [phone] if applicable to notify the office of your absence.    If you are in psychological crisis including not limited to suicidal/homicidal thoughts or thoughts of self-injury, do not hesitate to call/contact your County Crisis hotline (see below) or attend to the nearest emergency department.  [facility] Crisis: [phone]  [facility] Crisis: [phone]  [facility] Crisis: [phone]  [facility] Crisis: [phone]  [facility] Crisis: [phone]  [facility] Crisis: [phone]  [facility] Crisis: [phone]  National Suicide Prevention Hotline: 1-536.619.9953

## 2024-07-10 ENCOUNTER — OFFICE VISIT (OUTPATIENT)
Dept: PSYCHIATRY | Facility: CLINIC | Age: 34
End: 2024-07-10
Payer: MEDICARE

## 2024-07-10 ENCOUNTER — TELEPHONE (OUTPATIENT)
Dept: FAMILY MEDICINE CLINIC | Facility: CLINIC | Age: 34
End: 2024-07-10

## 2024-07-10 DIAGNOSIS — F41.1 GAD (GENERALIZED ANXIETY DISORDER): ICD-10-CM

## 2024-07-10 DIAGNOSIS — Z13.29 SCREENING FOR ENDOCRINE, METABOLIC AND IMMUNITY DISORDER: ICD-10-CM

## 2024-07-10 DIAGNOSIS — F20.0 SCHIZOPHRENIA, PARANOID TYPE (HCC): Primary | Chronic | ICD-10-CM

## 2024-07-10 DIAGNOSIS — Z13.228 SCREENING FOR ENDOCRINE, METABOLIC AND IMMUNITY DISORDER: ICD-10-CM

## 2024-07-10 DIAGNOSIS — Z13.0 SCREENING FOR ENDOCRINE, METABOLIC AND IMMUNITY DISORDER: ICD-10-CM

## 2024-07-10 PROCEDURE — 90792 PSYCH DIAG EVAL W/MED SRVCS: CPT | Performed by: PSYCHIATRY & NEUROLOGY

## 2024-07-10 NOTE — TELEPHONE ENCOUNTER
----- Message from Stepan Patel MD sent at 7/10/2024  8:38 AM EDT -----  Please call patient. Labs are stable. Will discuss more fully at next visit.
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
## 2024-08-06 LAB
ALBUMIN SERPL-MCNC: 4 G/DL (ref 3.5–5.7)
ALP SERPL-CCNC: 53 U/L (ref 35–120)
ALT SERPL-CCNC: 20 U/L
ANION GAP SERPL CALCULATED.3IONS-SCNC: 8 MMOL/L (ref 3–11)
AST SERPL-CCNC: 19 U/L
BASOPHILS # BLD AUTO: 0.1 THOU/CMM (ref 0–0.1)
BASOPHILS NFR BLD AUTO: 1 %
BILIRUB SERPL-MCNC: 0.4 MG/DL (ref 0.2–1)
BUN SERPL-MCNC: 11 MG/DL (ref 7–28)
CALCIUM SERPL-MCNC: 9.5 MG/DL (ref 8.5–10.1)
CHLORIDE SERPL-SCNC: 103 MMOL/L (ref 100–109)
CHOLEST SERPL-MCNC: 186 MG/DL
CHOLEST/HDLC SERPL: 3.8 {RATIO}
CO2 SERPL-SCNC: 32 MMOL/L (ref 21–31)
CREAT SERPL-MCNC: 1.06 MG/DL (ref 0.53–1.3)
CYTOLOGY CMNT CVX/VAG CYTO-IMP: ABNORMAL
DIFFERENTIAL METHOD BLD: NORMAL
EOSINOPHIL # BLD AUTO: 0 THOU/CMM (ref 0–0.5)
EOSINOPHIL NFR BLD AUTO: 0 %
ERYTHROCYTE [DISTWIDTH] IN BLOOD BY AUTOMATED COUNT: 14.3 % (ref 12–16)
EST. AVERAGE GLUCOSE BLD GHB EST-MCNC: 114 MG/DL
GFR/BSA.PRED SERPLBLD CYS-BASED-ARV: 95 ML/MIN/{1.73_M2}
GLUCOSE SERPL-MCNC: 99 MG/DL (ref 65–99)
HBA1C MFR BLD: 5.6 %
HCT VFR BLD AUTO: 42.4 % (ref 37–48)
HDLC SERPL-MCNC: 49 MG/DL (ref 23–92)
HGB BLD-MCNC: 13.9 G/DL (ref 12.5–17)
LDLC SERPL CALC-MCNC: 96 MG/DL
LYMPHOCYTES # BLD AUTO: 1.8 THOU/CMM (ref 1–3)
LYMPHOCYTES NFR BLD AUTO: 22 %
MCH RBC QN AUTO: 29.4 PG (ref 27–36)
MCHC RBC AUTO-ENTMCNC: 32.7 G/DL (ref 32–37)
MCV RBC AUTO: 90 FL (ref 80–100)
MONOCYTES # BLD AUTO: 0.6 THOU/CMM (ref 0.3–1)
MONOCYTES NFR BLD AUTO: 7 %
NEUTROPHILS # BLD AUTO: 5.9 THOU/CMM (ref 1.8–7.8)
NEUTROPHILS NFR BLD AUTO: 70 %
NONHDLC SERPL-MCNC: 137 MG/DL
PLATELET # BLD AUTO: 225 THOU/CMM (ref 140–350)
PMV BLD REES-ECKER: 9.3 FL (ref 7.5–11.3)
POTASSIUM SERPL-SCNC: 4.7 MMOL/L (ref 3.5–5.2)
PROT SERPL-MCNC: 6.5 G/DL (ref 6.3–8.3)
RBC # BLD AUTO: 4.72 MILL/CMM (ref 4–5.4)
SODIUM SERPL-SCNC: 143 MMOL/L (ref 135–145)
TRIGL SERPL-MCNC: 203 MG/DL
TSH SERPL-ACNC: 0.9 UIU/ML (ref 0.45–5.33)
WBC # BLD AUTO: 8.5 THOU/CMM (ref 4–10.5)

## 2024-08-15 ENCOUNTER — OFFICE VISIT (OUTPATIENT)
Dept: PSYCHIATRY | Facility: CLINIC | Age: 34
End: 2024-08-15
Payer: MEDICARE

## 2024-08-15 DIAGNOSIS — F20.0 SCHIZOPHRENIA, PARANOID TYPE (HCC): Primary | Chronic | ICD-10-CM

## 2024-08-15 DIAGNOSIS — F41.1 GAD (GENERALIZED ANXIETY DISORDER): ICD-10-CM

## 2024-08-15 PROCEDURE — 99213 OFFICE O/P EST LOW 20 MIN: CPT | Performed by: PSYCHIATRY & NEUROLOGY
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
## 2024-09-12 ENCOUNTER — TELEPHONE (OUTPATIENT)
Dept: PSYCHIATRY | Facility: CLINIC | Age: 34
End: 2024-09-12

## 2024-09-12 NOTE — TELEPHONE ENCOUNTER
Writer called patient and LVM to call office to make f/u appt.  Transfer call to resident MR to make appt.

## 2024-11-13 ENCOUNTER — TELEPHONE (OUTPATIENT)
Age: 34
End: 2024-11-13

## 2024-11-14 ENCOUNTER — OFFICE VISIT (OUTPATIENT)
Dept: PSYCHIATRY | Facility: CLINIC | Age: 34
End: 2024-11-14
Payer: MEDICARE

## 2024-11-14 ENCOUNTER — RESULTS FOLLOW-UP (OUTPATIENT)
Age: 34
End: 2024-11-14

## 2024-11-14 DIAGNOSIS — F41.1 GAD (GENERALIZED ANXIETY DISORDER): ICD-10-CM

## 2024-11-14 DIAGNOSIS — F51.05 INSOMNIA DUE TO OTHER MENTAL DISORDER: ICD-10-CM

## 2024-11-14 DIAGNOSIS — F20.0 SCHIZOPHRENIA, PARANOID TYPE (HCC): Primary | Chronic | ICD-10-CM

## 2024-11-14 DIAGNOSIS — F99 INSOMNIA DUE TO OTHER MENTAL DISORDER: ICD-10-CM

## 2024-11-14 PROCEDURE — 99214 OFFICE O/P EST MOD 30 MIN: CPT

## 2024-11-14 RX ORDER — TRAZODONE HYDROCHLORIDE 100 MG/1
100 TABLET ORAL
Qty: 90 TABLET | Refills: 0 | Status: SHIPPED | OUTPATIENT
Start: 2024-11-14 | End: 2025-02-12
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
## 2024-11-28 DIAGNOSIS — F20.0 SCHIZOPHRENIA, PARANOID TYPE (HCC): Chronic | ICD-10-CM

## 2024-11-28 DIAGNOSIS — F41.1 GAD (GENERALIZED ANXIETY DISORDER): ICD-10-CM

## 2024-12-02 RX ORDER — CITALOPRAM HYDROBROMIDE 20 MG/1
20 TABLET ORAL 2 TIMES DAILY
Qty: 180 TABLET | Refills: 0 | Status: SHIPPED | OUTPATIENT
Start: 2024-12-02

## 2024-12-02 RX ORDER — CARIPRAZINE 1.5 MG/1
CAPSULE, GELATIN COATED ORAL
Qty: 90 CAPSULE | Refills: 0 | Status: SHIPPED | OUTPATIENT
Start: 2024-12-02 | End: 2025-03-02

## 2024-12-02 RX ORDER — CLOZAPINE 100 MG/1
200 TABLET ORAL
Qty: 60 TABLET | Refills: 2 | Status: SHIPPED | OUTPATIENT
Start: 2024-12-02

## 2025-01-29 ENCOUNTER — OFFICE VISIT (OUTPATIENT)
Dept: PSYCHIATRY | Facility: CLINIC | Age: 35
End: 2025-01-29
Payer: MEDICARE

## 2025-01-29 DIAGNOSIS — F99 INSOMNIA DUE TO OTHER MENTAL DISORDER: ICD-10-CM

## 2025-01-29 DIAGNOSIS — F51.05 INSOMNIA DUE TO OTHER MENTAL DISORDER: ICD-10-CM

## 2025-01-29 DIAGNOSIS — F20.0 SCHIZOPHRENIA, PARANOID TYPE (HCC): Primary | Chronic | ICD-10-CM

## 2025-01-29 DIAGNOSIS — F41.1 GAD (GENERALIZED ANXIETY DISORDER): ICD-10-CM

## 2025-01-29 PROCEDURE — 99214 OFFICE O/P EST MOD 30 MIN: CPT

## 2025-01-29 RX ORDER — TRAZODONE HYDROCHLORIDE 50 MG/1
75 TABLET, FILM COATED ORAL
Qty: 90 TABLET | Refills: 0 | Status: SHIPPED | OUTPATIENT
Start: 2025-01-29 | End: 2025-04-29

## 2025-01-29 RX ORDER — CITALOPRAM HYDROBROMIDE 20 MG/1
20 TABLET ORAL 2 TIMES DAILY
Qty: 180 TABLET | Refills: 0 | Status: SHIPPED | OUTPATIENT
Start: 2025-01-29 | End: 2025-04-29

## 2025-01-29 RX ORDER — CLOZAPINE 100 MG/1
200 TABLET ORAL
Qty: 180 TABLET | Refills: 0 | Status: SHIPPED | OUTPATIENT
Start: 2025-01-29 | End: 2025-04-29
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
## 2025-03-21 ENCOUNTER — TELEPHONE (OUTPATIENT)
Age: 35
End: 2025-03-21

## 2025-03-21 NOTE — TELEPHONE ENCOUNTER
Writer received a call from a  at [facility] who reported that pt. Was admitted to them. She reported that they believe that pt. Is not taking his medications correctly and wanted to obtain an accurate medication list from the treatment team. Please call back at [phone].

## 2025-03-21 NOTE — TELEPHONE ENCOUNTER
Returned call to [facility] and spoke to Radha Chatterjee.  She informed me that Solo was admitted to their facility and they do not believe he has been taking his medication.  She asked when he was last seen and what medications he is on.  Provided her with last appointment date of 1/29 and last medications and dosages prescribed on that date.  States she sees other Clozaril dose from a Dr Arias and I informed her that is a [facility] doctor and I can only provide her with information from our office.  Nothing further needed at this time.    Will refer to Dr Amaya for review.

## 2025-03-24 ENCOUNTER — TELEPHONE (OUTPATIENT)
Age: 35
End: 2025-03-24

## 2025-03-24 NOTE — TELEPHONE ENCOUNTER
received a call from Radha at [facility] and she asked what pharmacy the previous medication was sent to on 1/29/25.  confirmed.    Tip: Hydropeel Tip, Clear

## 2025-03-28 ENCOUNTER — TELEPHONE (OUTPATIENT)
Age: 35
End: 2025-03-28

## 2025-03-28 NOTE — TELEPHONE ENCOUNTER
Radha Chatterjee nurse from [facility] short term in patient mental health crisis facility called in to schedule the patient with a follow up medication management appointment upon their discharge.    [phone] is the best telephone number to reach out with.

## 2025-04-02 ENCOUNTER — OFFICE VISIT (OUTPATIENT)
Dept: PSYCHIATRY | Facility: CLINIC | Age: 35
End: 2025-04-02
Payer: MEDICARE

## 2025-04-02 VITALS
DIASTOLIC BLOOD PRESSURE: 76 MMHG | SYSTOLIC BLOOD PRESSURE: 109 MMHG | BODY MASS INDEX: 29.16 KG/M2 | HEART RATE: 90 BPM | WEIGHT: 215 LBS

## 2025-04-02 DIAGNOSIS — F20.0 SCHIZOPHRENIA, PARANOID TYPE (HCC): Primary | Chronic | ICD-10-CM

## 2025-04-02 DIAGNOSIS — F41.1 GAD (GENERALIZED ANXIETY DISORDER): ICD-10-CM

## 2025-04-02 PROCEDURE — 99213 OFFICE O/P EST LOW 20 MIN: CPT
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
## 2025-05-27 ENCOUNTER — APPOINTMENT (OUTPATIENT)
Dept: LAB | Age: 35
End: 2025-05-27
Payer: MEDICARE

## 2025-05-27 LAB
ALBUMIN SERPL BCG-MCNC: 4.3 G/DL (ref 3.5–5)
ALP SERPL-CCNC: 61 U/L (ref 34–104)
ALT SERPL W P-5'-P-CCNC: 20 U/L (ref 7–52)
ANION GAP SERPL CALCULATED.3IONS-SCNC: 6 MMOL/L (ref 4–13)
AST SERPL W P-5'-P-CCNC: 20 U/L (ref 13–39)
BILIRUB SERPL-MCNC: 0.56 MG/DL (ref 0.2–1)
BUN SERPL-MCNC: 10 MG/DL (ref 5–25)
CALCIUM SERPL-MCNC: 9.5 MG/DL (ref 8.4–10.2)
CHLORIDE SERPL-SCNC: 104 MMOL/L (ref 96–108)
CHOLEST SERPL-MCNC: 183 MG/DL (ref ?–200)
CLOZAPINE SERPL-MCNC: <75 NG/ML (ref 350–900)
CO2 SERPL-SCNC: 31 MMOL/L (ref 21–32)
CREAT SERPL-MCNC: 1.04 MG/DL (ref 0.6–1.3)
EST. AVERAGE GLUCOSE BLD GHB EST-MCNC: 105 MG/DL
GFR SERPL CREATININE-BSD FRML MDRD: 93 ML/MIN/1.73SQ M
GLUCOSE P FAST SERPL-MCNC: 104 MG/DL (ref 65–99)
HBA1C MFR BLD: 5.3 %
HDLC SERPL-MCNC: 50 MG/DL
LDLC SERPL CALC-MCNC: 111 MG/DL (ref 0–100)
NONHDLC SERPL-MCNC: 133 MG/DL
POTASSIUM SERPL-SCNC: 4.2 MMOL/L (ref 3.5–5.3)
PROT SERPL-MCNC: 6.9 G/DL (ref 6.4–8.4)
SODIUM SERPL-SCNC: 141 MMOL/L (ref 135–147)
TRIGL SERPL-MCNC: 111 MG/DL (ref ?–150)
TSH SERPL DL<=0.05 MIU/L-ACNC: 0.55 UIU/ML (ref 0.45–4.5)

## 2025-05-28 ENCOUNTER — TELEPHONE (OUTPATIENT)
Dept: PSYCHIATRY | Facility: CLINIC | Age: 35
End: 2025-05-28

## 2025-05-28 ENCOUNTER — OFFICE VISIT (OUTPATIENT)
Dept: PSYCHIATRY | Facility: CLINIC | Age: 35
End: 2025-05-28
Payer: MEDICARE

## 2025-05-28 VITALS — BODY MASS INDEX: 29.84 KG/M2 | WEIGHT: 220 LBS

## 2025-05-28 DIAGNOSIS — F99 INSOMNIA DUE TO OTHER MENTAL DISORDER: ICD-10-CM

## 2025-05-28 DIAGNOSIS — F20.0 SCHIZOPHRENIA, PARANOID TYPE (HCC): Primary | Chronic | ICD-10-CM

## 2025-05-28 DIAGNOSIS — F51.05 INSOMNIA DUE TO OTHER MENTAL DISORDER: ICD-10-CM

## 2025-05-28 DIAGNOSIS — F41.1 GAD (GENERALIZED ANXIETY DISORDER): ICD-10-CM

## 2025-05-28 PROCEDURE — 99214 OFFICE O/P EST MOD 30 MIN: CPT

## 2025-05-28 RX ORDER — TRAZODONE HYDROCHLORIDE 50 MG/1
75 TABLET ORAL
Qty: 90 TABLET | Refills: 0 | Status: SHIPPED | OUTPATIENT
Start: 2025-05-28 | End: 2025-08-26

## 2025-05-28 RX ORDER — PALIPERIDONE 3 MG/1
TABLET, EXTENDED RELEASE ORAL
Qty: 35 TABLET | Refills: 0 | Status: SHIPPED | OUTPATIENT
Start: 2025-05-28 | End: 2025-06-11

## 2025-05-28 RX ORDER — CLONAZEPAM 0.5 MG/1
0.5 TABLET ORAL 2 TIMES DAILY PRN
Qty: 30 TABLET | Refills: 0 | Status: SHIPPED | OUTPATIENT
Start: 2025-05-28
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
## 2025-05-28 NOTE — TELEPHONE ENCOUNTER
This writer called the patient to inform him and Megan () that he is schedule to receive his Invega Sustena long acting injectable initiation on Silvia 10 at [facility].

## 2025-05-28 NOTE — TELEPHONE ENCOUNTER
Pts CM called to find out why she received a call after the patient was just seen in office. Writer informed them about the appt. And provided the infusion center phone number for the appt time. Writer obtained verbal permission to speak with Megan from the patient.

## 2025-05-30 ENCOUNTER — RESULTS FOLLOW-UP (OUTPATIENT)
Age: 35
End: 2025-05-30

## 2025-06-10 ENCOUNTER — HOSPITAL ENCOUNTER (OUTPATIENT)
Dept: INFUSION CENTER | Facility: HOSPITAL | Age: 35
Discharge: HOME | End: 2025-06-10
Payer: MEDICARE

## 2025-06-10 DIAGNOSIS — F20.0 SCHIZOPHRENIA, PARANOID TYPE (HCC): Primary | ICD-10-CM

## 2025-06-10 PROCEDURE — 96372 THER/PROPH/DIAG INJ SC/IM: CPT

## 2025-06-10 RX ADMIN — PALIPERIDONE PALMITATE 234 MG: 234 INJECTION INTRAMUSCULAR at 08:30

## 2025-06-10 NOTE — PROGRESS NOTES
Pt received Ivega IM inj in RIGHT deltoid w/out incident, offers no complaints. Scheduled next appt 6/17 @ 5582

## 2025-06-17 ENCOUNTER — HOSPITAL ENCOUNTER (OUTPATIENT)
Dept: INFUSION CENTER | Facility: HOSPITAL | Age: 35
Discharge: HOME | End: 2025-06-17
Payer: MEDICARE

## 2025-06-17 DIAGNOSIS — F20.0 SCHIZOPHRENIA, PARANOID TYPE (HCC): Primary | ICD-10-CM

## 2025-06-17 PROCEDURE — 96372 THER/PROPH/DIAG INJ SC/IM: CPT

## 2025-06-17 RX ADMIN — PALIPERIDONE PALMITATE 156 MG: 156 INJECTION INTRAMUSCULAR at 08:32

## 2025-06-17 NOTE — PROGRESS NOTES
Invega IM injection given in LEFT deltoid w/out incident, offers no complaints. Confirmed next appt, declined AVS

## 2025-07-02 ENCOUNTER — TELEPHONE (OUTPATIENT)
Age: 35
End: 2025-07-02

## 2025-07-02 DIAGNOSIS — Z13.228 ENCOUNTER FOR SCREENING FOR METABOLIC DISORDER: ICD-10-CM

## 2025-07-02 DIAGNOSIS — F20.0 SCHIZOPHRENIA, PARANOID TYPE (HCC): Primary | ICD-10-CM

## 2025-07-02 DIAGNOSIS — F41.1 GAD (GENERALIZED ANXIETY DISORDER): ICD-10-CM

## 2025-07-02 DIAGNOSIS — F20.0 SCHIZOPHRENIA, PARANOID TYPE (HCC): Chronic | ICD-10-CM

## 2025-07-02 NOTE — TELEPHONE ENCOUNTER
Writer rec a call from [facility] looking to see if pt needs labs done. Pt is there now and they would like a call back asap #[phone] writer unable to get ahold of clinical and resident line.

## 2025-07-02 NOTE — TELEPHONE ENCOUNTER
Attempted to call [facility] but number was unavailable. Called Solo ([phone]) and informed him that Dr. Amaya is ordering lab work. He will go back to the lab to have them completed.

## 2025-07-02 NOTE — TELEPHONE ENCOUNTER
Refill must be reviewed and completed by the office or provider. The refill is unable to be approved or denied by the medication management team.      05/29/2025 05/28/2025 05/28/2025 clonazePAM (Tablet) 30.0 15 0.5 MG NA ROBERTO FINK [facility] [place] 0 / 0 [place]   1 [id number] 08/12/2024 08/06/2024 06/06/2024 clonazePAM (Tablet) 30.0 30 0.5 MG NA CHERYL GODINEZ [facility] [place] 0 / 0 [place]

## 2025-07-02 NOTE — TELEPHONE ENCOUNTER
Dr. Amaya is having difficulty getting the labs ordered because Epic is blocking him from it. He is in contact with his attending. Called Solo ([phone]) and informed him of above so that he doesn't go to the lab yet. He verbalized understanding and will wait to hear from us.

## 2025-07-03 RX ORDER — CLONAZEPAM 0.5 MG/1
0.5 TABLET ORAL 2 TIMES DAILY PRN
Qty: 30 TABLET | Refills: 0 | Status: SHIPPED | OUTPATIENT
Start: 2025-07-03

## 2025-07-03 RX ORDER — PALIPERIDONE 3 MG/1
TABLET, EXTENDED RELEASE ORAL
Qty: 35 TABLET | Refills: 0 | OUTPATIENT
Start: 2025-07-03

## 2025-07-11 NOTE — TELEPHONE ENCOUNTER
Patient called in regard to lab work status to see if script was put in for him. Writer checked with resident MR and nurse. No lab work ordered at this time. Patient stated it is for Citalopram medication and he has been out of it for 2 days.   Patient would like to know status as soon as possible.

## 2025-07-16 ENCOUNTER — HOSPITAL ENCOUNTER (OUTPATIENT)
Dept: INFUSION CENTER | Facility: HOSPITAL | Age: 35
Discharge: HOME | End: 2025-07-16
Payer: MEDICARE

## 2025-07-16 DIAGNOSIS — F20.0 SCHIZOPHRENIA, PARANOID TYPE (HCC): Primary | ICD-10-CM

## 2025-07-16 PROCEDURE — 96372 THER/PROPH/DIAG INJ SC/IM: CPT

## 2025-07-16 RX ADMIN — PALIPERIDONE PALMITATE 156 MG: 156 INJECTION INTRAMUSCULAR at 08:57

## 2025-07-16 NOTE — PROGRESS NOTES
Pt tolerated Invega Sustenna injection to R deltoid without difficulty.  Confirmed next appt on 8/12 at 0900.  AVS declined.  Left ambulatory in stable condition.

## 2025-07-18 ENCOUNTER — OFFICE VISIT (OUTPATIENT)
Dept: PSYCHIATRY | Facility: CLINIC | Age: 35
End: 2025-07-18

## 2025-07-18 DIAGNOSIS — Z13.228 ENCOUNTER FOR SCREENING FOR METABOLIC DISORDER: ICD-10-CM

## 2025-07-18 DIAGNOSIS — F41.1 GAD (GENERALIZED ANXIETY DISORDER): ICD-10-CM

## 2025-07-18 DIAGNOSIS — F20.0 SCHIZOPHRENIA, PARANOID TYPE (HCC): Primary | Chronic | ICD-10-CM

## 2025-07-18 PROCEDURE — PBNCHG PB NO CHARGE PLACEHOLDER

## 2025-07-18 NOTE — BH CRISIS PLAN
Client Name: Solo Pierre       Client YOB: 1990    Libby Safety Plan      Creation Date: 4/10/24 Update Date: 7/18/25   Created By: Sathya Lobo MD Last Updated By: Duran Amaya MD      Step 1: Warning Signs:   Warning Signs   When I'm not social with others   When I'm not doing things   When I'm sleeping too much            Step 2: Internal Coping Strategies:   Internal Coping Strategies   Driving   Working on cars   Reading magazines   Exercising            Step 3: People and social settings that provide distraction:   Name Contact Information   Angelica Pierre [phone]   Richard Pierre [phone]    Places   The park   The mall           Step 4: People whom I can ask for help during a crisis:      Name Contact Information    Angelica Pierre [phone]    Richard Pierre [phone]      Step 5: Professionals or agencies I can contact during a crisis:      Clinican/Agency Name Phone Emergency Contact    Dr. Lobo [phone]       [facility] Emergency Department Emergency Department Phone Emergency Department Address    [phone]        Crisis Phone Numbers:   Suicide Prevention Lifeline: Call or Text  687 Crisis Text Line: Text HOME to 121-436   Please note: Some [facility] do not have a separate number for Child/Adolescent specific crisis. If your county is not listed under Child/Adolescent, please call the adult number for your county      Adult Crisis Numbers: Child/Adolescent Crisis Numbers   [facility]: [phone] [facility]: [phone]   [facility]: [phone] [facility]: [phone]   [facility]: [phone] [place]: [phone]   [facility]: [phone] [place] [facility]: [phone]   [place] [facility]: [phone]   [facility]: [phone]   [facility]: [phone]   [facility]: [phone] (daytime) [phone] (after hours, weekends,  holidays)      Step 6: Making the environment safer (plan for lethal means safety):   Patient did not identify any lethal methods: Yes     Optional: What is most important to me and worth living for?   I want to keep on going, meet new people, have relationships and friendships.     [place] Safety Plan. Kathryn Méndez and Rachid Perea. Used with permission of the authors.

## 2025-07-18 NOTE — BH TREATMENT PLAN
"TREATMENT PLAN (Medication Management Only)        [facility]    Name and Date of Birth:  Solo Pierre 35 y.o. 1990  MRN: [id number]  Date of Treatment Plan: July 18, 2025  Diagnosis/Diagnoses:    1. Schizophrenia, paranoid type (HCC)    2. YISEL (generalized anxiety disorder)      Strengths/Personal Resources for Self-Care: ability to communicate needs, ability to listen, motivation for treatment, willingness to work on problems.  Area/Areas of need (in own words): \"I want to feel better and lead a good life\"  1. Long Term Goal:   improve control of psychotic symptoms.  Target Date:6 months - 1/18/2026  Person/Persons responsible for completion of goal: Solo  2.  Short Term Objective (s) - How will we reach this goal?:   A.  Provider new recommended medication/dosage changes and/or continue medication(s): continue current medications as prescribed.  B.  Attend medication management appointments regularly. Keep all scheduled appointments..  C.  Avoid alcohol. Avoid drugs. Call family when needed. Call supportive people when needed. Get regular sleep every night. Get to bed at the same time every night. Increase socialization with peers. Try not to isoalte self. Visit supportive people..  Target Date:6 months - 1/18/2026  Person/Persons Responsible for Completion of Goal: Solo  Progress Towards Goals: continuing treatment  Treatment Modality: medication management every 8 weeks  Review due 180 days from date of this plan: 6 months - 1/18/2026  Expected length of service: ongoing treatment unless revised  My Physician/PA/NP and I have developed this plan together and I agree to work on the goals and objectives. I understand the treatment goals that were developed for my treatment.   Electronic Signatures: on file (unless signed below)    Duran Amaya MD 07/18/25  "
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
## (undated) DEVICE — DRAPE EQUIPMENT RF WAND

## (undated) DEVICE — INTENDED FOR TISSUE SEPARATION, AND OTHER PROCEDURES THAT REQUIRE A SHARP SURGICAL BLADE TO PUNCTURE OR CUT.: Brand: BARD-PARKER SAFETY BLADES SIZE 15, STERILE

## (undated) DEVICE — 2000CC GUARDIAN II: Brand: GUARDIAN

## (undated) DEVICE — SPONGE STICK WITH PVP-I: Brand: KENDALL

## (undated) DEVICE — SCD SEQUENTIAL COMPRESSION COMFORT SLEEVE MEDIUM KNEE LENGTH: Brand: KENDALL SCD

## (undated) DEVICE — PLUMEPEN PRO 10FT

## (undated) DEVICE — POSITION CUSHION INSERT LARGE PRONEVIEW

## (undated) DEVICE — CRADLE EXTREMITY UNIVERSAL CONTOURED

## (undated) DEVICE — NEEDLE HYPO 22G X 1-1/2 IN

## (undated) DEVICE — STRETCH BANDAGE: Brand: CURITY

## (undated) DEVICE — BETHLEHEM UNIVERSAL MINOR GEN: Brand: CARDINAL HEALTH

## (undated) DEVICE — MEDI-VAC YANKAUER SUCTION HANDLE W/BULBOUS AND CONTROL VENT: Brand: CARDINAL HEALTH

## (undated) DEVICE — GLOVE SRG BIOGEL 7

## (undated) DEVICE — GLOVE INDICATOR PI UNDERGLOVE SZ 6.5 BLUE

## (undated) DEVICE — GLOVE SRG BIOGEL 6.5

## (undated) DEVICE — GLOVE INDICATOR PI UNDERGLOVE SZ 7 BLUE

## (undated) DEVICE — TELFA NON-ADHERENT ABSORBENT DRESSING: Brand: TELFA

## (undated) DEVICE — TUBING SUCTION 5MM X 12 FT